# Patient Record
Sex: FEMALE | Race: WHITE | Employment: UNEMPLOYED | URBAN - METROPOLITAN AREA
[De-identification: names, ages, dates, MRNs, and addresses within clinical notes are randomized per-mention and may not be internally consistent; named-entity substitution may affect disease eponyms.]

---

## 2017-06-07 ENCOUNTER — HOSPITAL ENCOUNTER (OUTPATIENT)
Age: 72
Setting detail: SPECIMEN
Discharge: HOME | End: 2017-06-07
Payer: MEDICARE

## 2017-06-07 LAB
ANION GAP SERPL CALCULATED.3IONS-SCNC: 23 MEQ/L (ref 7–13)
BUN BLDV-MCNC: 22 MG/DL (ref 8–23)
CALCIUM SERPL-MCNC: 9.2 MG/DL (ref 8.6–10.2)
CHLORIDE BLD-SCNC: 92 MEQ/L (ref 98–107)
CO2: 21 MEQ/L (ref 22–29)
CREAT SERPL-MCNC: 0.72 MG/DL (ref 0.5–0.9)
GFR AFRICAN AMERICAN: >60
GFR NON-AFRICAN AMERICAN: >60
GLUCOSE BLD-MCNC: 42 MG/DL (ref 74–109)
POTASSIUM SERPL-SCNC: 4.1 MEQ/L (ref 3.5–5.1)
SODIUM BLD-SCNC: 136 MEQ/L (ref 132–144)

## 2017-06-07 PROCEDURE — 80048 BASIC METABOLIC PNL TOTAL CA: CPT

## 2018-06-04 ENCOUNTER — HOSPITAL ENCOUNTER (OUTPATIENT)
Dept: WOMENS IMAGING | Age: 73
Discharge: HOME | End: 2018-06-06
Payer: MEDICARE

## 2018-06-04 DIAGNOSIS — Z12.39 BREAST CANCER SCREENING: ICD-10-CM

## 2018-06-04 PROCEDURE — 77067 SCR MAMMO BI INCL CAD: CPT

## 2019-05-10 ENCOUNTER — HOSPITAL ENCOUNTER (OUTPATIENT)
Age: 74
Discharge: HOME | End: 2019-05-10
Payer: MEDICARE

## 2019-09-11 ENCOUNTER — HOSPITAL ENCOUNTER (OUTPATIENT)
Dept: WOMENS IMAGING | Age: 74
Discharge: HOME | End: 2019-09-13
Payer: MEDICARE

## 2019-09-11 DIAGNOSIS — Z12.39 BREAST CANCER SCREENING: ICD-10-CM

## 2019-09-11 PROCEDURE — 77067 SCR MAMMO BI INCL CAD: CPT

## 2020-12-31 ENCOUNTER — HOSPITAL ENCOUNTER (OUTPATIENT)
Dept: LAB | Age: 75
Discharge: HOME | End: 2020-12-31
Payer: MEDICARE

## 2020-12-31 LAB
ALBUMIN SERPL-MCNC: 4.4 G/DL (ref 3.5–4.6)
ALP BLD-CCNC: 70 U/L (ref 40–130)
ALT SERPL-CCNC: 38 U/L (ref 0–33)
ANION GAP SERPL CALCULATED.3IONS-SCNC: 13 MEQ/L (ref 9–15)
AST SERPL-CCNC: 24 U/L (ref 0–35)
BILIRUB SERPL-MCNC: 0.5 MG/DL (ref 0.2–0.7)
BUN BLDV-MCNC: 20 MG/DL (ref 8–23)
CALCIUM SERPL-MCNC: 9.6 MG/DL (ref 8.5–9.9)
CHLORIDE BLD-SCNC: 98 MEQ/L (ref 95–107)
CHOLESTEROL, TOTAL: 243 MG/DL (ref 0–199)
CO2: 27 MEQ/L (ref 20–31)
CREAT SERPL-MCNC: 0.83 MG/DL (ref 0.5–0.9)
GFR AFRICAN AMERICAN: >60
GFR NON-AFRICAN AMERICAN: >60
GLOBULIN: 3.2 G/DL (ref 2.3–3.5)
GLUCOSE BLD-MCNC: 105 MG/DL (ref 70–99)
HCT VFR BLD CALC: 43.5 % (ref 37–47)
HDLC SERPL-MCNC: 63 MG/DL (ref 40–59)
HEMOGLOBIN: 14.4 G/DL (ref 12–16)
LDL CHOLESTEROL CALCULATED: 150 MG/DL (ref 0–129)
MCH RBC QN AUTO: 32.5 PG (ref 27–31.3)
MCHC RBC AUTO-ENTMCNC: 33.1 % (ref 33–37)
MCV RBC AUTO: 98.3 FL (ref 82–100)
PDW BLD-RTO: 12.4 % (ref 11.5–14.5)
PLATELET # BLD: 279 K/UL (ref 130–400)
POTASSIUM SERPL-SCNC: 4 MEQ/L (ref 3.4–4.9)
RBC # BLD: 4.43 M/UL (ref 4.2–5.4)
SODIUM BLD-SCNC: 138 MEQ/L (ref 135–144)
TOTAL PROTEIN: 7.6 G/DL (ref 6.3–8)
TRIGL SERPL-MCNC: 151 MG/DL (ref 0–150)
WBC # BLD: 7.5 K/UL (ref 4.8–10.8)

## 2020-12-31 PROCEDURE — 80061 LIPID PANEL: CPT

## 2020-12-31 PROCEDURE — 36415 COLL VENOUS BLD VENIPUNCTURE: CPT

## 2020-12-31 PROCEDURE — 85027 COMPLETE CBC AUTOMATED: CPT

## 2020-12-31 PROCEDURE — 80053 COMPREHEN METABOLIC PANEL: CPT

## 2021-12-03 ENCOUNTER — HOSPITAL ENCOUNTER (OUTPATIENT)
Dept: LAB | Age: 76
Discharge: HOME | End: 2021-12-03
Payer: MEDICARE

## 2021-12-03 LAB
ALBUMIN SERPL-MCNC: 4.4 G/DL (ref 3.5–4.6)
ALP BLD-CCNC: 78 U/L (ref 40–130)
ALT SERPL-CCNC: 28 U/L (ref 0–33)
ANION GAP SERPL CALCULATED.3IONS-SCNC: 15 MEQ/L (ref 9–15)
AST SERPL-CCNC: 24 U/L (ref 0–35)
BILIRUB SERPL-MCNC: 0.6 MG/DL (ref 0.2–0.7)
BUN BLDV-MCNC: 23 MG/DL (ref 8–23)
CALCIUM SERPL-MCNC: 9.5 MG/DL (ref 8.5–9.9)
CHLORIDE BLD-SCNC: 96 MEQ/L (ref 95–107)
CHOLESTEROL, TOTAL: 249 MG/DL (ref 0–199)
CO2: 22 MEQ/L (ref 20–31)
CREAT SERPL-MCNC: 0.79 MG/DL (ref 0.5–0.9)
GFR AFRICAN AMERICAN: >60
GFR NON-AFRICAN AMERICAN: >60
GLOBULIN: 3.4 G/DL (ref 2.3–3.5)
GLUCOSE BLD-MCNC: 98 MG/DL (ref 70–99)
HCT VFR BLD CALC: 43.8 % (ref 37–47)
HDLC SERPL-MCNC: 62 MG/DL (ref 40–59)
HEMOGLOBIN: 14.4 G/DL (ref 12–16)
LDL CHOLESTEROL CALCULATED: 164 MG/DL (ref 0–129)
MCH RBC QN AUTO: 32 PG (ref 27–31.3)
MCHC RBC AUTO-ENTMCNC: 32.9 % (ref 33–37)
MCV RBC AUTO: 97.2 FL (ref 82–100)
PDW BLD-RTO: 13 % (ref 11.5–14.5)
PLATELET # BLD: 318 K/UL (ref 130–400)
POTASSIUM SERPL-SCNC: 4.3 MEQ/L (ref 3.4–4.9)
RBC # BLD: 4.51 M/UL (ref 4.2–5.4)
SODIUM BLD-SCNC: 133 MEQ/L (ref 135–144)
TOTAL PROTEIN: 7.8 G/DL (ref 6.3–8)
TRIGL SERPL-MCNC: 117 MG/DL (ref 0–150)
WBC # BLD: 7.3 K/UL (ref 4.8–10.8)

## 2021-12-03 PROCEDURE — 85027 COMPLETE CBC AUTOMATED: CPT

## 2021-12-03 PROCEDURE — 36415 COLL VENOUS BLD VENIPUNCTURE: CPT

## 2021-12-03 PROCEDURE — 80061 LIPID PANEL: CPT

## 2021-12-03 PROCEDURE — 80053 COMPREHEN METABOLIC PANEL: CPT

## 2023-07-21 ENCOUNTER — HOSPITAL ENCOUNTER (OUTPATIENT)
Dept: LAB | Age: 78
Discharge: HOME | End: 2023-07-21
Payer: MEDICARE

## 2023-07-21 LAB
ALBUMIN SERPL-MCNC: 4.4 G/DL (ref 3.5–4.6)
ALP SERPL-CCNC: 65 U/L (ref 40–130)
ALT SERPL-CCNC: 13 U/L (ref 0–33)
ANION GAP SERPL CALCULATED.3IONS-SCNC: 10 MEQ/L (ref 9–15)
AST SERPL-CCNC: 14 U/L (ref 0–35)
BILIRUB SERPL-MCNC: 0.4 MG/DL (ref 0.2–0.7)
BUN SERPL-MCNC: 17 MG/DL (ref 8–23)
CALCIUM SERPL-MCNC: 9.3 MG/DL (ref 8.5–9.9)
CHLORIDE SERPL-SCNC: 100 MEQ/L (ref 95–107)
CHOLEST SERPL-MCNC: 218 MG/DL (ref 0–199)
CO2 SERPL-SCNC: 26 MEQ/L (ref 20–31)
CREAT SERPL-MCNC: 0.64 MG/DL (ref 0.5–0.9)
ERYTHROCYTE [DISTWIDTH] IN BLOOD BY AUTOMATED COUNT: 12.3 % (ref 11.5–14.5)
GLOBULIN SER CALC-MCNC: 2.8 G/DL (ref 2.3–3.5)
GLUCOSE SERPL-MCNC: 116 MG/DL (ref 70–99)
HCT VFR BLD AUTO: 41.1 % (ref 37–47)
HDLC SERPL-MCNC: 68 MG/DL (ref 40–59)
HGB BLD-MCNC: 14 G/DL (ref 12–16)
LDLC SERPL CALC-MCNC: 130 MG/DL (ref 0–129)
MCH RBC QN AUTO: 32.9 PG (ref 27–31.3)
MCHC RBC AUTO-ENTMCNC: 34 % (ref 33–37)
MCV RBC AUTO: 96.9 FL (ref 79.4–94.8)
PLATELET # BLD AUTO: 273 K/UL (ref 130–400)
POTASSIUM SERPL-SCNC: 4.1 MEQ/L (ref 3.4–4.9)
PROT SERPL-MCNC: 7.2 G/DL (ref 6.3–8)
RBC # BLD AUTO: 4.24 M/UL (ref 4.2–5.4)
SODIUM SERPL-SCNC: 136 MEQ/L (ref 135–144)
TRIGL SERPL-MCNC: 98 MG/DL (ref 0–150)
WBC # BLD AUTO: 7.1 K/UL (ref 4.8–10.8)

## 2023-07-21 PROCEDURE — 80061 LIPID PANEL: CPT

## 2023-07-21 PROCEDURE — 80053 COMPREHEN METABOLIC PANEL: CPT

## 2023-07-21 PROCEDURE — 36415 COLL VENOUS BLD VENIPUNCTURE: CPT

## 2023-07-21 PROCEDURE — 85027 COMPLETE CBC AUTOMATED: CPT

## 2024-03-07 ENCOUNTER — ANESTHESIA EVENT (OUTPATIENT)
Dept: OPERATING ROOM | Facility: HOSPITAL | Age: 79
DRG: 481 | End: 2024-03-07
Payer: MEDICARE

## 2024-03-07 ENCOUNTER — APPOINTMENT (OUTPATIENT)
Dept: RADIOLOGY | Facility: HOSPITAL | Age: 79
DRG: 481 | End: 2024-03-07
Payer: MEDICARE

## 2024-03-07 ENCOUNTER — HOSPITAL ENCOUNTER (INPATIENT)
Facility: HOSPITAL | Age: 79
LOS: 4 days | Discharge: NURSING, REHAB OR LONG-TERM CARE | DRG: 481 | End: 2024-03-11
Attending: EMERGENCY MEDICINE | Admitting: HOSPITALIST
Payer: MEDICARE

## 2024-03-07 ENCOUNTER — APPOINTMENT (OUTPATIENT)
Dept: CARDIOLOGY | Facility: HOSPITAL | Age: 79
DRG: 481 | End: 2024-03-07
Payer: MEDICARE

## 2024-03-07 DIAGNOSIS — S72.344A: Primary | ICD-10-CM

## 2024-03-07 LAB
ABO GROUP (TYPE) IN BLOOD: NORMAL
ANION GAP SERPL CALC-SCNC: 15 MMOL/L (ref 10–20)
ANTIBODY SCREEN: NORMAL
BASOPHILS # BLD AUTO: 0.05 X10*3/UL (ref 0–0.1)
BASOPHILS NFR BLD AUTO: 0.4 %
BUN SERPL-MCNC: 18 MG/DL (ref 6–23)
CALCIUM SERPL-MCNC: 9.1 MG/DL (ref 8.6–10.3)
CHLORIDE SERPL-SCNC: 97 MMOL/L (ref 98–107)
CO2 SERPL-SCNC: 25 MMOL/L (ref 21–32)
CREAT SERPL-MCNC: 0.7 MG/DL (ref 0.5–1.05)
EGFRCR SERPLBLD CKD-EPI 2021: 88 ML/MIN/1.73M*2
EOSINOPHIL # BLD AUTO: 0.04 X10*3/UL (ref 0–0.4)
EOSINOPHIL NFR BLD AUTO: 0.3 %
ERYTHROCYTE [DISTWIDTH] IN BLOOD BY AUTOMATED COUNT: 11.6 % (ref 11.5–14.5)
GLUCOSE SERPL-MCNC: 123 MG/DL (ref 74–99)
HCT VFR BLD AUTO: 38.2 % (ref 36–46)
HGB BLD-MCNC: 13.1 G/DL (ref 12–16)
IMM GRANULOCYTES # BLD AUTO: 0.07 X10*3/UL (ref 0–0.5)
IMM GRANULOCYTES NFR BLD AUTO: 0.5 % (ref 0–0.9)
INR PPP: 1 (ref 0.9–1.1)
LYMPHOCYTES # BLD AUTO: 1.11 X10*3/UL (ref 0.8–3)
LYMPHOCYTES NFR BLD AUTO: 7.9 %
MCH RBC QN AUTO: 32.5 PG (ref 26–34)
MCHC RBC AUTO-ENTMCNC: 34.3 G/DL (ref 32–36)
MCV RBC AUTO: 95 FL (ref 80–100)
MONOCYTES # BLD AUTO: 0.92 X10*3/UL (ref 0.05–0.8)
MONOCYTES NFR BLD AUTO: 6.5 %
NEUTROPHILS # BLD AUTO: 11.89 X10*3/UL (ref 1.6–5.5)
NEUTROPHILS NFR BLD AUTO: 84.4 %
NRBC BLD-RTO: 0 /100 WBCS (ref 0–0)
PLATELET # BLD AUTO: 266 X10*3/UL (ref 150–450)
POTASSIUM SERPL-SCNC: 3.7 MMOL/L (ref 3.5–5.3)
PROTHROMBIN TIME: 11.5 SECONDS (ref 9.8–12.8)
RBC # BLD AUTO: 4.03 X10*6/UL (ref 4–5.2)
RH FACTOR (ANTIGEN D): NORMAL
SODIUM SERPL-SCNC: 133 MMOL/L (ref 136–145)
WBC # BLD AUTO: 14.1 X10*3/UL (ref 4.4–11.3)

## 2024-03-07 PROCEDURE — 80048 BASIC METABOLIC PNL TOTAL CA: CPT | Performed by: PHYSICIAN ASSISTANT

## 2024-03-07 PROCEDURE — 73700 CT LOWER EXTREMITY W/O DYE: CPT | Mod: RT

## 2024-03-07 PROCEDURE — 99285 EMERGENCY DEPT VISIT HI MDM: CPT | Mod: 25

## 2024-03-07 PROCEDURE — 73560 X-RAY EXAM OF KNEE 1 OR 2: CPT | Mod: RT

## 2024-03-07 PROCEDURE — 2500000001 HC RX 250 WO HCPCS SELF ADMINISTERED DRUGS (ALT 637 FOR MEDICARE OP): Performed by: HOSPITALIST

## 2024-03-07 PROCEDURE — 2500000004 HC RX 250 GENERAL PHARMACY W/ HCPCS (ALT 636 FOR OP/ED): Performed by: PHYSICIAN ASSISTANT

## 2024-03-07 PROCEDURE — 73590 X-RAY EXAM OF LOWER LEG: CPT | Mod: RT

## 2024-03-07 PROCEDURE — 1100000001 HC PRIVATE ROOM DAILY

## 2024-03-07 PROCEDURE — 85025 COMPLETE CBC W/AUTO DIFF WBC: CPT | Performed by: PHYSICIAN ASSISTANT

## 2024-03-07 PROCEDURE — 73700 CT LOWER EXTREMITY W/O DYE: CPT | Mod: FOREIGN READ | Performed by: RADIOLOGY

## 2024-03-07 PROCEDURE — 36415 COLL VENOUS BLD VENIPUNCTURE: CPT | Performed by: PHYSICIAN ASSISTANT

## 2024-03-07 PROCEDURE — 73590 X-RAY EXAM OF LOWER LEG: CPT | Mod: RIGHT SIDE | Performed by: RADIOLOGY

## 2024-03-07 PROCEDURE — 93005 ELECTROCARDIOGRAM TRACING: CPT

## 2024-03-07 PROCEDURE — 73552 X-RAY EXAM OF FEMUR 2/>: CPT | Mod: RIGHT SIDE | Performed by: RADIOLOGY

## 2024-03-07 PROCEDURE — 73560 X-RAY EXAM OF KNEE 1 OR 2: CPT | Mod: RIGHT SIDE | Performed by: RADIOLOGY

## 2024-03-07 PROCEDURE — 73552 X-RAY EXAM OF FEMUR 2/>: CPT | Mod: RT

## 2024-03-07 PROCEDURE — 76377 3D RENDER W/INTRP POSTPROCES: CPT

## 2024-03-07 PROCEDURE — 85610 PROTHROMBIN TIME: CPT | Performed by: PHYSICIAN ASSISTANT

## 2024-03-07 PROCEDURE — 76377 3D RENDER W/INTRP POSTPROCES: CPT | Mod: FOREIGN READ | Performed by: RADIOLOGY

## 2024-03-07 PROCEDURE — 86900 BLOOD TYPING SEROLOGIC ABO: CPT | Performed by: PHYSICIAN ASSISTANT

## 2024-03-07 PROCEDURE — 99223 1ST HOSP IP/OBS HIGH 75: CPT | Performed by: HOSPITALIST

## 2024-03-07 PROCEDURE — 2500000004 HC RX 250 GENERAL PHARMACY W/ HCPCS (ALT 636 FOR OP/ED): Performed by: HOSPITALIST

## 2024-03-07 PROCEDURE — 96374 THER/PROPH/DIAG INJ IV PUSH: CPT

## 2024-03-07 RX ORDER — ENOXAPARIN SODIUM 100 MG/ML
40 INJECTION SUBCUTANEOUS EVERY 24 HOURS
Status: DISCONTINUED | OUTPATIENT
Start: 2024-03-07 | End: 2024-03-11 | Stop reason: HOSPADM

## 2024-03-07 RX ORDER — ACETAMINOPHEN 325 MG/1
650 TABLET ORAL EVERY 4 HOURS PRN
Status: DISCONTINUED | OUTPATIENT
Start: 2024-03-07 | End: 2024-03-11 | Stop reason: HOSPADM

## 2024-03-07 RX ORDER — ACETAMINOPHEN 650 MG/1
650 SUPPOSITORY RECTAL EVERY 4 HOURS PRN
Status: DISCONTINUED | OUTPATIENT
Start: 2024-03-07 | End: 2024-03-11 | Stop reason: HOSPADM

## 2024-03-07 RX ORDER — ALPRAZOLAM 0.25 MG/1
0.25 TABLET ORAL NIGHTLY PRN
Status: DISCONTINUED | OUTPATIENT
Start: 2024-03-07 | End: 2024-03-11 | Stop reason: HOSPADM

## 2024-03-07 RX ORDER — ACETAMINOPHEN 160 MG/5ML
650 SOLUTION ORAL EVERY 4 HOURS PRN
Status: DISCONTINUED | OUTPATIENT
Start: 2024-03-07 | End: 2024-03-11 | Stop reason: HOSPADM

## 2024-03-07 RX ORDER — OXYCODONE HYDROCHLORIDE 5 MG/1
5 TABLET ORAL EVERY 4 HOURS PRN
Status: DISCONTINUED | OUTPATIENT
Start: 2024-03-07 | End: 2024-03-11 | Stop reason: HOSPADM

## 2024-03-07 RX ORDER — AMLODIPINE BESYLATE 5 MG/1
5 TABLET ORAL NIGHTLY
Status: DISCONTINUED | OUTPATIENT
Start: 2024-03-07 | End: 2024-03-11 | Stop reason: HOSPADM

## 2024-03-07 RX ORDER — SODIUM CHLORIDE 9 MG/ML
75 INJECTION, SOLUTION INTRAVENOUS CONTINUOUS
Status: DISCONTINUED | OUTPATIENT
Start: 2024-03-08 | End: 2024-03-09

## 2024-03-07 RX ORDER — POLYETHYLENE GLYCOL 3350 17 G/17G
17 POWDER, FOR SOLUTION ORAL DAILY PRN
Status: DISCONTINUED | OUTPATIENT
Start: 2024-03-07 | End: 2024-03-11 | Stop reason: HOSPADM

## 2024-03-07 RX ORDER — HYDROXYZINE HYDROCHLORIDE 25 MG/1
25 TABLET, FILM COATED ORAL EVERY 6 HOURS PRN
Status: DISCONTINUED | OUTPATIENT
Start: 2024-03-07 | End: 2024-03-11 | Stop reason: HOSPADM

## 2024-03-07 RX ORDER — LORAZEPAM 2 MG/ML
0.5 INJECTION INTRAMUSCULAR ONCE
Status: COMPLETED | OUTPATIENT
Start: 2024-03-07 | End: 2024-03-07

## 2024-03-07 RX ORDER — ONDANSETRON HYDROCHLORIDE 2 MG/ML
4 INJECTION, SOLUTION INTRAVENOUS EVERY 8 HOURS PRN
Status: DISCONTINUED | OUTPATIENT
Start: 2024-03-07 | End: 2024-03-11 | Stop reason: HOSPADM

## 2024-03-07 RX ORDER — MORPHINE SULFATE 4 MG/ML
4 INJECTION, SOLUTION INTRAMUSCULAR; INTRAVENOUS EVERY 4 HOURS PRN
Status: DISCONTINUED | OUTPATIENT
Start: 2024-03-07 | End: 2024-03-11 | Stop reason: HOSPADM

## 2024-03-07 RX ORDER — AMLODIPINE BESYLATE 5 MG/1
1 TABLET ORAL NIGHTLY
COMMUNITY

## 2024-03-07 RX ORDER — ALPRAZOLAM 0.25 MG/1
0.25 TABLET ORAL NIGHTLY PRN
COMMUNITY

## 2024-03-07 RX ORDER — ONDANSETRON 4 MG/1
4 TABLET, FILM COATED ORAL EVERY 8 HOURS PRN
Status: DISCONTINUED | OUTPATIENT
Start: 2024-03-07 | End: 2024-03-11 | Stop reason: HOSPADM

## 2024-03-07 RX ORDER — LOSARTAN POTASSIUM AND HYDROCHLOROTHIAZIDE 12.5; 1 MG/1; MG/1
1 TABLET ORAL NIGHTLY
COMMUNITY

## 2024-03-07 RX ORDER — CYCLOBENZAPRINE HCL 10 MG
5 TABLET ORAL 3 TIMES DAILY PRN
Status: DISCONTINUED | OUTPATIENT
Start: 2024-03-07 | End: 2024-03-11 | Stop reason: HOSPADM

## 2024-03-07 RX ADMIN — MORPHINE SULFATE 4 MG: 4 INJECTION, SOLUTION INTRAMUSCULAR; INTRAVENOUS at 22:28

## 2024-03-07 RX ADMIN — AMLODIPINE BESYLATE 5 MG: 5 TABLET ORAL at 20:24

## 2024-03-07 RX ADMIN — ACETAMINOPHEN 650 MG: 325 TABLET ORAL at 20:23

## 2024-03-07 RX ADMIN — LORAZEPAM 0.5 MG: 2 INJECTION INTRAMUSCULAR; INTRAVENOUS at 13:05

## 2024-03-07 RX ADMIN — ENOXAPARIN SODIUM 40 MG: 40 INJECTION SUBCUTANEOUS at 20:24

## 2024-03-07 RX ADMIN — OXYCODONE HYDROCHLORIDE 5 MG: 5 TABLET ORAL at 16:47

## 2024-03-07 RX ADMIN — OXYCODONE HYDROCHLORIDE 5 MG: 5 TABLET ORAL at 21:53

## 2024-03-07 RX ADMIN — ALPRAZOLAM 0.25 MG: 0.25 TABLET ORAL at 20:23

## 2024-03-07 RX ADMIN — LOSARTAN POTASSIUM: 100 TABLET, FILM COATED ORAL at 20:23

## 2024-03-07 RX ADMIN — CYCLOBENZAPRINE HYDROCHLORIDE 5 MG: 10 TABLET, FILM COATED ORAL at 16:49

## 2024-03-07 SDOH — SOCIAL STABILITY: SOCIAL INSECURITY: WERE YOU ABLE TO COMPLETE ALL THE BEHAVIORAL HEALTH SCREENINGS?: YES

## 2024-03-07 SDOH — SOCIAL STABILITY: SOCIAL INSECURITY: ABUSE: ADULT

## 2024-03-07 SDOH — SOCIAL STABILITY: SOCIAL INSECURITY: ARE THERE ANY APPARENT SIGNS OF INJURIES/BEHAVIORS THAT COULD BE RELATED TO ABUSE/NEGLECT?: NO

## 2024-03-07 SDOH — SOCIAL STABILITY: SOCIAL INSECURITY: HAS ANYONE EVER THREATENED TO HURT YOUR FAMILY OR YOUR PETS?: NO

## 2024-03-07 SDOH — SOCIAL STABILITY: SOCIAL INSECURITY: DOES ANYONE TRY TO KEEP YOU FROM HAVING/CONTACTING OTHER FRIENDS OR DOING THINGS OUTSIDE YOUR HOME?: NO

## 2024-03-07 SDOH — SOCIAL STABILITY: SOCIAL INSECURITY: DO YOU FEEL ANYONE HAS EXPLOITED OR TAKEN ADVANTAGE OF YOU FINANCIALLY OR OF YOUR PERSONAL PROPERTY?: NO

## 2024-03-07 SDOH — SOCIAL STABILITY: SOCIAL INSECURITY: ARE YOU OR HAVE YOU BEEN THREATENED OR ABUSED PHYSICALLY, EMOTIONALLY, OR SEXUALLY BY ANYONE?: NO

## 2024-03-07 SDOH — SOCIAL STABILITY: SOCIAL INSECURITY: HAVE YOU HAD THOUGHTS OF HARMING ANYONE ELSE?: NO

## 2024-03-07 ASSESSMENT — PAIN - FUNCTIONAL ASSESSMENT
PAIN_FUNCTIONAL_ASSESSMENT: 0-10

## 2024-03-07 ASSESSMENT — PAIN SCALES - GENERAL
PAINLEVEL_OUTOF10: 8
PAINLEVEL_OUTOF10: 5 - MODERATE PAIN
PAINLEVEL_OUTOF10: 5 - MODERATE PAIN
PAINLEVEL_OUTOF10: 3
PAINLEVEL_OUTOF10: 9
PAINLEVEL_OUTOF10: 8
PAINLEVEL_OUTOF10: 6
PAINLEVEL_OUTOF10: 8

## 2024-03-07 ASSESSMENT — COGNITIVE AND FUNCTIONAL STATUS - GENERAL
MOBILITY SCORE: 15
EATING MEALS: A LITTLE
CLIMB 3 TO 5 STEPS WITH RAILING: TOTAL
WALKING IN HOSPITAL ROOM: TOTAL
TURNING FROM BACK TO SIDE WHILE IN FLAT BAD: A LITTLE
HELP NEEDED FOR BATHING: A LOT
HELP NEEDED FOR BATHING: A LOT
MOVING TO AND FROM BED TO CHAIR: A LITTLE
DRESSING REGULAR LOWER BODY CLOTHING: A LOT
PERSONAL GROOMING: A LITTLE
PATIENT BASELINE BEDBOUND: NO
DAILY ACTIVITIY SCORE: 18
CLIMB 3 TO 5 STEPS WITH RAILING: TOTAL
MOVING TO AND FROM BED TO CHAIR: A LOT
MOBILITY SCORE: 10
MOVING TO AND FROM BED TO CHAIR: A LITTLE
TURNING FROM BACK TO SIDE WHILE IN FLAT BAD: A LITTLE
STANDING UP FROM CHAIR USING ARMS: A LITTLE
WALKING IN HOSPITAL ROOM: TOTAL
MOBILITY SCORE: 15
DRESSING REGULAR LOWER BODY CLOTHING: A LOT
TOILETING: A LOT
STANDING UP FROM CHAIR USING ARMS: A LOT
HELP NEEDED FOR BATHING: A LOT
DAILY ACTIVITIY SCORE: 16
TURNING FROM BACK TO SIDE WHILE IN FLAT BAD: A LOT
TOILETING: A LITTLE
DAILY ACTIVITIY SCORE: 13
DRESSING REGULAR UPPER BODY CLOTHING: A LITTLE
DRESSING REGULAR UPPER BODY CLOTHING: A LOT
MOVING FROM LYING ON BACK TO SITTING ON SIDE OF FLAT BED WITH BEDRAILS: A LOT
TOILETING: A LOT
CLIMB 3 TO 5 STEPS WITH RAILING: TOTAL
DRESSING REGULAR UPPER BODY CLOTHING: A LITTLE
PERSONAL GROOMING: A LOT
WALKING IN HOSPITAL ROOM: TOTAL
DRESSING REGULAR LOWER BODY CLOTHING: A LOT
STANDING UP FROM CHAIR USING ARMS: A LITTLE

## 2024-03-07 ASSESSMENT — LIFESTYLE VARIABLES
EVER FELT BAD OR GUILTY ABOUT YOUR DRINKING: NO
HAVE PEOPLE ANNOYED YOU BY CRITICIZING YOUR DRINKING: NO
SKIP TO QUESTIONS 9-10: 1
HOW OFTEN DO YOU HAVE 6 OR MORE DRINKS ON ONE OCCASION: NEVER
AUDIT-C TOTAL SCORE: 0
SUBSTANCE_ABUSE_PAST_12_MONTHS: NO
PRESCIPTION_ABUSE_PAST_12_MONTHS: NO
HOW MANY STANDARD DRINKS CONTAINING ALCOHOL DO YOU HAVE ON A TYPICAL DAY: PATIENT DOES NOT DRINK
EVER HAD A DRINK FIRST THING IN THE MORNING TO STEADY YOUR NERVES TO GET RID OF A HANGOVER: NO
HAVE YOU EVER FELT YOU SHOULD CUT DOWN ON YOUR DRINKING: NO
AUDIT-C TOTAL SCORE: 0
HOW OFTEN DO YOU HAVE A DRINK CONTAINING ALCOHOL: NEVER

## 2024-03-07 ASSESSMENT — ENCOUNTER SYMPTOMS
ENDOCRINE NEGATIVE: 1
CONSTITUTIONAL NEGATIVE: 1
ARTHRALGIAS: 1
FEVER: 0
EYES NEGATIVE: 1
ALLERGIC/IMMUNOLOGIC NEGATIVE: 1
PSYCHIATRIC NEGATIVE: 1
SHORTNESS OF BREATH: 0
HEMATOLOGIC/LYMPHATIC NEGATIVE: 1
GASTROINTESTINAL NEGATIVE: 1
ACTIVITY CHANGE: 0

## 2024-03-07 ASSESSMENT — ACTIVITIES OF DAILY LIVING (ADL)
LACK_OF_TRANSPORTATION: NO
PATIENT'S MEMORY ADEQUATE TO SAFELY COMPLETE DAILY ACTIVITIES?: YES
ADEQUATE_TO_COMPLETE_ADL: YES
WALKS IN HOME: INDEPENDENT
TOILETING: INDEPENDENT
GROOMING: INDEPENDENT
FEEDING YOURSELF: INDEPENDENT
HEARING - LEFT EAR: FUNCTIONAL
HEARING - RIGHT EAR: FUNCTIONAL
BATHING: INDEPENDENT
JUDGMENT_ADEQUATE_SAFELY_COMPLETE_DAILY_ACTIVITIES: YES
DRESSING YOURSELF: INDEPENDENT

## 2024-03-07 ASSESSMENT — PAIN DESCRIPTION - ORIENTATION
ORIENTATION: RIGHT
ORIENTATION: LEFT

## 2024-03-07 ASSESSMENT — PAIN DESCRIPTION - ONSET: ONSET: SUDDEN

## 2024-03-07 ASSESSMENT — PATIENT HEALTH QUESTIONNAIRE - PHQ9
SUM OF ALL RESPONSES TO PHQ9 QUESTIONS 1 & 2: 0
2. FEELING DOWN, DEPRESSED OR HOPELESS: NOT AT ALL
1. LITTLE INTEREST OR PLEASURE IN DOING THINGS: NOT AT ALL

## 2024-03-07 ASSESSMENT — PAIN DESCRIPTION - DESCRIPTORS
DESCRIPTORS: ACHING;SHARP
DESCRIPTORS: PRESSURE
DESCRIPTORS: SHARP
DESCRIPTORS: SHARP

## 2024-03-07 ASSESSMENT — PAIN DESCRIPTION - PAIN TYPE: TYPE: ACUTE PAIN

## 2024-03-07 ASSESSMENT — COLUMBIA-SUICIDE SEVERITY RATING SCALE - C-SSRS
2. HAVE YOU ACTUALLY HAD ANY THOUGHTS OF KILLING YOURSELF?: NO
1. IN THE PAST MONTH, HAVE YOU WISHED YOU WERE DEAD OR WISHED YOU COULD GO TO SLEEP AND NOT WAKE UP?: NO
6. HAVE YOU EVER DONE ANYTHING, STARTED TO DO ANYTHING, OR PREPARED TO DO ANYTHING TO END YOUR LIFE?: NO

## 2024-03-07 ASSESSMENT — PAIN DESCRIPTION - PROGRESSION: CLINICAL_PROGRESSION: NOT CHANGED

## 2024-03-07 ASSESSMENT — PAIN DESCRIPTION - FREQUENCY: FREQUENCY: CONSTANT/CONTINUOUS

## 2024-03-07 ASSESSMENT — PAIN DESCRIPTION - LOCATION
LOCATION: LEG
LOCATION: KNEE

## 2024-03-07 NOTE — ED PROVIDER NOTES
HPI   Chief Complaint   Patient presents with    Knee Injury     Right knee injury       A 78-year-old female patient with history of bilateral knee replacements comes in the emergency department today by EMS secondary to a fall at home.  States she was getting up from a chair turning around when her heel was caught on her pant leg causing her right knee to twist causing her to fall down to the ground secondary to the pain.  Denies hitting her head or loss consciousness.  States all her pain is significantly located just superior to the right knee.  She rates her pain a 6 out of 10 on the pain scale at this time.  States any movement causes this pain to significantly increased in severity.                          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  Social History     Tobacco Use    Smoking status: Never    Smokeless tobacco: Never   Vaping Use    Vaping Use: Never used   Substance Use Topics    Alcohol use: Defer    Drug use: Never       Physical Exam   ED Triage Vitals [03/07/24 1057]   Temperature Heart Rate Respirations BP   36.9 °C (98.4 °F) 88 20 165/59      Pulse Ox Temp Source Heart Rate Source Patient Position   96 % Temporal Monitor Sitting      BP Location FiO2 (%)     Right arm --       Physical Exam  Constitutional:       General: She is in acute distress (Moderate distress).      Appearance: Normal appearance. She is not ill-appearing or diaphoretic.   HENT:       Head: Normocephalic and atraumatic.      Nose: Nose normal.   Eyes:      Extraocular Movements: Extraocular movements intact.      Conjunctiva/sclera: Conjunctivae normal.   Cardiovascular:      Rate and Rhythm: Normal rate and regular rhythm.   Pulmonary:      Effort: Pulmonary effort is normal. No respiratory distress.      Breath sounds: Normal breath sounds. No stridor. No wheezing.   Musculoskeletal:         General: Tenderness (Tenderness palpation just.  To the right patella as well as medial lateral aspects of the right knee, superior fibula) present. Normal range of motion.      Cervical back: Normal range of motion.   Skin:     General: Skin is warm and dry.   Neurological:      General: No focal deficit present.      Mental Status: She is alert and oriented to person, place, and time. Mental status is at baseline.   Psychiatric:         Mood and Affect: Mood normal.         ED Course & MDM   Diagnoses as of 03/07/24 1429   Nondisplaced spiral fracture of shaft of right femur, initial encounter for closed fracture (CMS/[facility])       Medical Decision Making  A 78-year-old female patient with history of bilateral knee replacements comes in the emergency department today by EMS secondary to a fall at home.  States she was getting up from a chair turning around when her heel was caught on her pant leg causing her right knee to twist causing her to fall down to the ground secondary to the pain.  Denies hitting her head or loss consciousness.  States all her pain is significantly located just superior to the right knee.  She rates her pain a 6 out of 10 on the pain scale at this time.  States any movement causes this pain to significantly increased in severity.    X-ray of the right femur, right knee, right tib-fib ordered to rule out any acute bony abnormality, dislocations of the right lower extremity.  Offered medication for pain at this time patient declines    Patient's metabolic panel shows glucose 123  sodium 133.  Patient's INR 1 white blood cell count of 14.1 absolute neutrophil count 11.89.  Type and screen also sent.  Patient's femur films show an oblique mildly displaced fracture through the distal femoral diaphysis just proximal to the femoral component of the total knee arthroplasty.  I did discuss this patient with Dr. Jauregui with orthopedic surgery.  He states that to place the patient in a knee immobilizer get a CTA of the femur and admit patient to medicine.  States he will do procedure tomorrow.    CT of the femur ordered.  Call placed to medicine for admission    Historians patient    Diagnosis: Distal femur fracture, right    Patient is feeling very anxious about this news is requesting something for anxiety.  0.5 IV of Ativan ordered.  EKG: My EKG interpretation, 80 bpm, sinus rhythm, right bundle branch block    I discussed case with Dr. Diaz the hospitalist who agrees admit the patient under his service      Labs Reviewed   CBC WITH AUTO DIFFERENTIAL - Abnormal       Result Value    WBC 14.1 (*)     nRBC 0.0      RBC 4.03      Hemoglobin 13.1      Hematocrit 38.2      MCV 95      MCH 32.5      MCHC 34.3      RDW 11.6      Platelets 266      Neutrophils % 84.4      Immature Granulocytes %, Automated 0.5      Lymphocytes % 7.9      Monocytes % 6.5      Eosinophils % 0.3      Basophils % 0.4      Neutrophils Absolute 11.89 (*)     Immature Granulocytes Absolute, Automated 0.07      Lymphocytes Absolute 1.11      Monocytes Absolute 0.92 (*)     Eosinophils Absolute 0.04      Basophils Absolute 0.05     BASIC METABOLIC PANEL - Abnormal    Glucose 123 (*)     Sodium 133 (*)     Potassium 3.7      Chloride 97 (*)     Bicarbonate 25      Anion Gap 15      Urea Nitrogen 18      Creatinine 0.70      eGFR 88      Calcium 9.1     PROTIME-INR - Normal    Protime 11.5      INR 1.0     TYPE AND SCREEN    ABO TYPE O      Rh TYPE POS      ANTIBODY SCREEN NEG          XR femur right 2+ views   Final Result    Oblique mildly displaced fracture through the distal femoral diaphysis   just proximal to the femoral component of the total knee arthroplasty.   Signed by Wojciech Tubbs MD      XR tibia fibula right 2 views   Final Result   Oblique mildly displaced fracture through the distal femoral diaphysis   just proximal to the femoral component of the total knee arthroplasty.   Signed by Wojciech Tubbs MD      XR knee right 1-2 views   Final Result   Oblique mildly displaced fracture through the distal femoral diaphysis   just proximal to the femoral component of the total knee arthroplasty.   Signed by Wojciech Tubbs MD      CT femur right wo IV contrast    (Results Pending)   CT 3D reconstruction    (Results Pending)           Shared CARLOS Attestation:    This patient was seen by the advanced practice provider.  I personally saw the patient and made/approved the management plan and take responsibility for the patient management.    History: 78-year-old female presents with right leg pain after fall.    Exam: Regular rate and rhythm cardiac exam with clear breath sounds bilaterally.  Abdomen soft and nontender.  Neurological exam is grossly intact.  There is tenderness to palpation to the right distal thigh.    MDM: Multisystem trauma    I independently interpreted the following study(s): My interpretation of right femur x-ray shows a displaced distal femur fracture near the prosthesis    I have seen and examined the patient, agree with the workup, evaluation, medical decision making, management and diagnosis.  The care plan has been discussed.    Jose Alberto Gagnon MD      Procedure  Procedures     Harish Elizabeth PA-C  03/07/24 1429

## 2024-03-07 NOTE — CONSULTS
Consults      Consult Note  Patient: Aida Baker  Unit/Bed: AC15/AC15  YOB: 1945  MRN: [id number]  Acct: [id number]   Admitting Diagnosis: No admission diagnoses are documented for this encounter.  Date:  3/7/2024  Hospital Day: 0    Complaint:  Right leg pain    History of Present Illness:  Aida Baker is a 78-year-old female patient per chart review with history of parathyroid carcinoma and hypertension who presented to [facility] emergency room status post mechanical fall at home prior to arrival.  She reports standing up from her dining room table when her pant leg got caught causing her to twist her right lower extremity causing her to fall to the ground.  She denies hitting her head or loss of consciousness.  She was unable to get up or ambulate due to pain therefore was brought in via EMS.  Imaging performed in the emergency room showed patient to have a mildly displaced distal femur fracture to the femoral component of the total knee arthroplasty in which orthopedics was consulted for evaluation and treatment recommendations.        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    Personal history of other diseases of the circulatory system 01/10/2018    History of hypertension       PSHx:  Past Surgical History:   Procedure Laterality Date    COLONOSCOPY  01/10/2018    Complete Colonoscopy    HEMORRHOID SURGERY  01/10/2018    Hemorrhoidectomy    THYROID SURGERY  01/10/2018    Thyroid Surgery    TOTAL KNEE ARTHROPLASTY  01/10/2018    Knee Replacement       Social Hx:  Social History     Socioeconomic History    Marital status:      Spouse name: None    Number of children: None    Years of education: None    Highest education level: None   Occupational History    None   Tobacco Use    Smoking status: Never    Smokeless tobacco: Never   Vaping Use    Vaping Use: Never used   Substance and  Sexual Activity    Alcohol use: Defer    Drug use: Never    Sexual activity: Defer   Other Topics Concern    None   Social History Narrative    None     Social Determinants of Health     Financial Resource Strain: Not on file   Food Insecurity: Not on file   Transportation Needs: Not on file   Physical Activity: Not on file   Stress: Not on file   Social Connections: Not on file   Intimate Partner Violence: Not on file   Housing Stability: Not on file       Family Hx:  No family history on file.    Review of Systems:   Review of Systems   Constitutional: Negative.  Negative for activity change and fever.   HENT: Negative.     Eyes: Negative.    Respiratory:  Negative for shortness of breath.    Cardiovascular:  Positive for leg swelling. Negative for chest pain.   Gastrointestinal: Negative.    Endocrine: Negative.    Genitourinary: Negative.    Musculoskeletal:  Positive for arthralgias and gait problem.   Skin: Negative.    Allergic/Immunologic: Negative.    Hematological: Negative.    Psychiatric/Behavioral: Negative.     All other systems reviewed and are negative.        Physical Examination:    Visit Vitals  /73   Pulse 91   Temp 36.9 °C (98.4 °F) (Temporal)   Resp 20      Physical Exam  Vitals and nursing note reviewed.   Constitutional:       General: She is not in acute distress.     Appearance: Normal appearance.   HENT:      Head: Normocephalic.      Nose: Nose normal.      Mouth/Throat:      Mouth: Mucous membranes are moist.   Eyes:      Extraocular Movements: Extraocular movements intact.      Pupils: Pupils are equal, round, and reactive to light.   Cardiovascular:      Rate and Rhythm: Normal rate and regular rhythm.      Pulses: Normal pulses.      Heart sounds: Normal heart sounds.   Pulmonary:      Effort: Pulmonary effort is normal.      Breath sounds: Normal breath sounds.   Abdominal:      General: Bowel sounds are normal.      Palpations: Abdomen is soft.   Musculoskeletal:          "General: Tenderness, deformity and signs of injury present.      Cervical back: Normal range of motion. No rigidity or tenderness.   Skin:     General: Skin is warm.      Capillary Refill: Capillary refill takes less than 2 seconds.   Neurological:      General: No focal deficit present.      Mental Status: She is alert and oriented to person, place, and time.   Psychiatric:         Mood and Affect: Mood normal.         LABS:  CBC:   Lab Results   Component Value Date    WBC 14.1 (H) 03/07/2024    RBC 4.03 03/07/2024    HGB 13.1 03/07/2024    HCT 38.2 03/07/2024    MCV 95 03/07/2024    MCH 32.5 03/07/2024    MCHC 34.3 03/07/2024    RDW 11.6 03/07/2024     03/07/2024     CBC with Differential:    Lab Results   Component Value Date    WBC 14.1 (H) 03/07/2024    RBC 4.03 03/07/2024    HGB 13.1 03/07/2024    HCT 38.2 03/07/2024     03/07/2024    MCV 95 03/07/2024    MCH 32.5 03/07/2024    MCHC 34.3 03/07/2024    RDW 11.6 03/07/2024    NRBC 0.0 03/07/2024    LYMPHOPCT 7.9 03/07/2024    MONOPCT 6.5 03/07/2024    EOSPCT 0.3 03/07/2024    BASOPCT 0.4 03/07/2024    MONOSABS 0.92 (H) 03/07/2024    LYMPHSABS 1.11 03/07/2024    EOSABS 0.04 03/07/2024    BASOSABS 0.05 03/07/2024     CMP:    Lab Results   Component Value Date     (L) 03/07/2024    K 3.7 03/07/2024    CL 97 (L) 03/07/2024    CO2 25 03/07/2024    BUN 18 03/07/2024    CREATININE 0.70 03/07/2024    GLUCOSE 123 (H) 03/07/2024    CALCIUM 9.1 03/07/2024     BMP:    Lab Results   Component Value Date     (L) 03/07/2024    K 3.7 03/07/2024    CL 97 (L) 03/07/2024    CO2 25 03/07/2024    BUN 18 03/07/2024    CREATININE 0.70 03/07/2024    CALCIUM 9.1 03/07/2024    GLUCOSE 123 (H) 03/07/2024     Magnesium:No results found for: \"MG\"  Troponin:  No results found for: \"TROPONINI\"    Imaging   ECG 12 lead    Result Date: 3/7/2024  Sinus rhythm with Premature atrial complexes Right bundle branch block Abnormal ECG When compared with ECG of 04-MAY-2004 " 11:32, Premature atrial complexes are now Present Right bundle branch block is now Present    XR femur right 2+ views    Result Date: 3/7/2024  STUDY: Knee, Femur and Tibia/Fibular Radiographs; 3/7/24 at 12:09 PM INDICATION: Fall, pain. COMPARISON: None available. ACCESSION NUMBER(S): [id number], [id number], [id number] ORDERING CLINICIAN: MARLENE BYRNE TECHNIQUE:  Two view(s) of the right knee.  Two views right femur-four images. Two views right tibia/fibula-four images. FINDINGS: Right Knee:  The total knee arthroplasty is in anatomic alignment. Oblique mildly displaced fracture through the distal femoral diaphysis just proximal to the femoral component of the total knee arthroplasty.  Right Femur:  There is no displaced fracture.  The alignment is anatomic.  No soft tissue abnormality is seen. Right Tibia/Fibula:  There is no displaced fracture.  The alignment is anatomic.  No soft tissue abnormality is seen.    Oblique mildly displaced fracture through the distal femoral diaphysis just proximal to the femoral component of the total knee arthroplasty. Signed by Wojciech Tubbs MD    XR tibia fibula right 2 views    Result Date: 3/7/2024  STUDY: Knee, Femur and Tibia/Fibular Radiographs; 3/7/24 at 12:09 PM INDICATION: Fall, pain. COMPARISON: None available. ACCESSION NUMBER(S): [id number], [id number], [id number] ORDERING CLINICIAN: MARLENE BYRNE TECHNIQUE:  Two view(s) of the right knee.  Two views right femur-four images. Two views right tibia/fibula-four images. FINDINGS: Right Knee:  The total knee arthroplasty is in anatomic alignment. Oblique mildly displaced fracture through the distal femoral diaphysis just proximal to the femoral component of the total knee arthroplasty.  Right Femur:  There is no displaced fracture.  The alignment is anatomic.  No soft tissue abnormality is seen. Right Tibia/Fibula:  There is no displaced fracture.  The alignment is anatomic.  No soft tissue abnormality is  seen.    Oblique mildly displaced fracture through the distal femoral diaphysis just proximal to the femoral component of the total knee arthroplasty. Signed by Wojciech Tubbs MD    XR knee right 1-2 views    Result Date: 3/7/2024  STUDY: Knee, Femur and Tibia/Fibular Radiographs; 3/7/24 at 12:09 PM INDICATION: Fall, pain. COMPARISON: None available. ACCESSION NUMBER(S): [id number], [id number], [id number] ORDERING CLINICIAN: MARLENE BYRNE TECHNIQUE:  Two view(s) of the right knee.  Two views right femur-four images. Two views right tibia/fibula-four images. FINDINGS: Right Knee:  The total knee arthroplasty is in anatomic alignment. Oblique mildly displaced fracture through the distal femoral diaphysis just proximal to the femoral component of the total knee arthroplasty.  Right Femur:  There is no displaced fracture.  The alignment is anatomic.  No soft tissue abnormality is seen. Right Tibia/Fibula:  There is no displaced fracture.  The alignment is anatomic.  No soft tissue abnormality is seen.    Oblique mildly displaced fracture through the distal femoral diaphysis just proximal to the femoral component of the total knee arthroplasty. Signed by Wojciech Tubbs MD        Assessment:      78-year-old female patient presented to [facility] with complaints of right lower extremity pain status post fall at home prior to arrival.    Imaging was reviewed and consistent with right distal femur fracture through the femoral component of previous total knee arthroplasty.  CT scan is pending at this time for further evaluation of fracture pattern.    Upon assessment patient's knee is in an immobilizer.  She reports better pain control with immobilization.  However pain is exacerbated by movement or palpation.  The extremity is neurovascular intact.     Fracture will require operative repair to include open reduction internal fixation versus intramedullary nailing.  Plan will be for surgery tomorrow.   She has been placed on the OR schedule.    Thank you for this consultation and allowing us to be a part of this patient's care.    Plan:  N.p.o. at midnight with the exception of sips of water for medication  Consent for ORIF versus IM nail right femur  Continue pain control  Strict bedrest  Knee immobilizer at all times with the exception of skin care and hygiene            Electronically signed by SHERYL Sanchez on 3/7/2024 at 1:53 PM

## 2024-03-07 NOTE — CARE PLAN
The patient's goals for the shift include pain control    The clinical goals for the shift include no falls    Over the shift, the patient did not make progress toward the following goals. Barriers to progression include ***. Recommendations to address these barriers include ***.

## 2024-03-07 NOTE — PROGRESS NOTES
03/07/24 1704   Discharge Planning   Living Arrangements Alone   Support Systems Children;Family members   Assistance Needed Prior to fall pt states completely independent in ADLS and IADLS without AD, drives, states 1 other fall when pulling weeds but denies injury, this fall pt states tripped and fell when getting up from dining room chair - resulted in right distal femur fracture. Pt owns walker, cane, crutches, walk-in shower with shower chair, toilet riser   Type of Residence Private residence  (3 level home, pt states everything needed on main level including laundry, no need to go upstairs or basement)   Number of Stairs Within Residence 13   Do you have animals or pets at home? Yes   Type of Animals or Pets 2 cats   Home or Post Acute Services In home services   Type of Home Care Services Home OT;Home PT   Patient expects to be discharged to: Home with C   Does the patient need discharge transport arranged? No   Financial Resource Strain   How hard is it for you to pay for the very basics like food, housing, medical care, and heating? Not hard   Housing Stability   In the last 12 months, was there a time when you were not able to pay the mortgage or rent on time? N   In the last 12 months, how many places have you lived? 1   In the last 12 months, was there a time when you did not have a steady place to sleep or slept in a shelter (including now)? N   Transportation Needs   In the past 12 months, has lack of transportation kept you from medical appointments or from getting medications? no   In the past 12 months, has lack of transportation kept you from meetings, work, or from getting things needed for daily living? No   Patient Choice   Provider Choice list and CMS website (https://medicare.gov/care-compare#search) for post-acute Quality and Resource Measure Data were provided and reviewed with: Patient   Patient / Family choosing to utilize agency / facility established prior to hospitalization No      Pt is from home alone, admitted after fall at home with right distal femur fracture and scheduled to go to OR tomorrow 3/8 for surgical intervention. Pt states DC preference is home with [facility], states will not go to SNF. Pt also states that her grand-daughter will stay with her to assist as needed. CT team will monitor case for progression and DC planning.

## 2024-03-07 NOTE — H&P
History Of Present Illness  Aida Baker is a 78 y.o. female presenting with history of HTN, HLD, presented to the ED after mechanical fall at home and had right knee pain. Per patient she tripped and fell at home and while getting up from a chair in her dining room. She is normally independent and has no difficulty with ambulation. In the ED she as found to have right distal femur fracture and admitted for for surgical intervention.      Past Medical History  She has a past medical history of Malignant neoplasm of parathyroid gland (CMS/HCC) and Personal history of other diseases of the circulatory system (01/10/2018).    Surgical History  She has a past surgical history that includes Colonoscopy (01/10/2018); Total knee arthroplasty (01/10/2018); Hemorrhoid surgery (01/10/2018); and Thyroid surgery (01/10/2018).     Social History  She reports that she has never smoked. She has never used smokeless tobacco. Alcohol use questions deferred to the physician. She reports that she does not use drugs.    Family History +CAD     10 point review of systems negative except per HPI      Last Recorded Vitals  /62   Pulse 92   Temp 36.9 °C (98.4 °F) (Temporal)   Resp 18   Wt 95.3 kg (210 lb)   SpO2 95%     Physical Exam   Gen: NAD  HEENT: EOM, MMM  CV: RRR, no murmurs rubs or gallops  Resp: coarse rhonchi   Abdomen: soft, NT,+BS  LE: No edema      Relevant Results  Labs Reviewed   CBC WITH AUTO DIFFERENTIAL - Abnormal       Result Value    WBC 14.1 (*)     nRBC 0.0      RBC 4.03      Hemoglobin 13.1      Hematocrit 38.2      MCV 95      MCH 32.5      MCHC 34.3      RDW 11.6      Platelets 266      Neutrophils % 84.4      Immature Granulocytes %, Automated 0.5      Lymphocytes % 7.9      Monocytes % 6.5      Eosinophils % 0.3      Basophils % 0.4      Neutrophils Absolute 11.89 (*)     Immature Granulocytes Absolute, Automated 0.07      Lymphocytes Absolute 1.11      Monocytes Absolute 0.92 (*)     Eosinophils  Absolute 0.04      Basophils Absolute 0.05     BASIC METABOLIC PANEL - Abnormal    Glucose 123 (*)     Sodium 133 (*)     Potassium 3.7      Chloride 97 (*)     Bicarbonate 25      Anion Gap 15      Urea Nitrogen 18      Creatinine 0.70      eGFR 88      Calcium 9.1     PROTIME-INR - Normal    Protime 11.5      INR 1.0     TYPE AND SCREEN    ABO TYPE O      Rh TYPE POS      ANTIBODY SCREEN NEG       XR femur right 2+ views   Final Result   Oblique mildly displaced fracture through the distal femoral diaphysis   just proximal to the femoral component of the total knee arthroplasty.   Signed by Wojciech Tubbs MD      XR tibia fibula right 2 views   Final Result   Oblique mildly displaced fracture through the distal femoral diaphysis   just proximal to the femoral component of the total knee arthroplasty.   Signed by Wojciech Tubbs MD      XR knee right 1-2 views   Final Result   Oblique mildly displaced fracture through the distal femoral diaphysis   just proximal to the femoral component of the total knee arthroplasty.   Signed by Wojciech Tubbs MD      CT femur right wo IV contrast    (Results Pending)   CT 3D reconstruction    (Results Pending)     Assessment/Plan  78 year old female admitted with right distal femur fracture after mechanical fall    -ortho consulted, plan for intervention tomorrow  -pain control  -PT/OT after surgery    HTN: resume home meds    DVT ppx: lovenox          Jewel Diaz MD

## 2024-03-08 ENCOUNTER — APPOINTMENT (OUTPATIENT)
Dept: RADIOLOGY | Facility: HOSPITAL | Age: 79
DRG: 481 | End: 2024-03-08
Payer: MEDICARE

## 2024-03-08 ENCOUNTER — ANESTHESIA (OUTPATIENT)
Dept: OPERATING ROOM | Facility: HOSPITAL | Age: 79
DRG: 481 | End: 2024-03-08
Payer: MEDICARE

## 2024-03-08 LAB
ANION GAP SERPL CALC-SCNC: 10 MMOL/L (ref 10–20)
BUN SERPL-MCNC: 16 MG/DL (ref 6–23)
CALCIUM SERPL-MCNC: 8.5 MG/DL (ref 8.6–10.3)
CHLORIDE SERPL-SCNC: 98 MMOL/L (ref 98–107)
CO2 SERPL-SCNC: 28 MMOL/L (ref 21–32)
CREAT SERPL-MCNC: 0.74 MG/DL (ref 0.5–1.05)
EGFRCR SERPLBLD CKD-EPI 2021: 82 ML/MIN/1.73M*2
ERYTHROCYTE [DISTWIDTH] IN BLOOD BY AUTOMATED COUNT: 11.6 % (ref 11.5–14.5)
GLUCOSE SERPL-MCNC: 113 MG/DL (ref 74–99)
HCT VFR BLD AUTO: 33.2 % (ref 36–46)
HGB BLD-MCNC: 11.5 G/DL (ref 12–16)
MAGNESIUM SERPL-MCNC: 1.91 MG/DL (ref 1.6–2.4)
MCH RBC QN AUTO: 33.2 PG (ref 26–34)
MCHC RBC AUTO-ENTMCNC: 34.6 G/DL (ref 32–36)
MCV RBC AUTO: 96 FL (ref 80–100)
NRBC BLD-RTO: 0 /100 WBCS (ref 0–0)
PLATELET # BLD AUTO: 229 X10*3/UL (ref 150–450)
POTASSIUM SERPL-SCNC: 3.3 MMOL/L (ref 3.5–5.3)
RBC # BLD AUTO: 3.46 X10*6/UL (ref 4–5.2)
SODIUM SERPL-SCNC: 133 MMOL/L (ref 136–145)
WBC # BLD AUTO: 7.2 X10*3/UL (ref 4.4–11.3)

## 2024-03-08 PROCEDURE — 2780000003 HC OR 278 NO HCPCS: Performed by: ORTHOPAEDIC SURGERY

## 2024-03-08 PROCEDURE — 0QS636Z REPOSITION RIGHT UPPER FEMUR WITH INTRAMEDULLARY INTERNAL FIXATION DEVICE, PERCUTANEOUS APPROACH: ICD-10-PCS | Performed by: ORTHOPAEDIC SURGERY

## 2024-03-08 PROCEDURE — 3600000009 HC OR TIME - EACH INCREMENTAL 1 MINUTE - PROCEDURE LEVEL FOUR: Performed by: ORTHOPAEDIC SURGERY

## 2024-03-08 PROCEDURE — 2500000004 HC RX 250 GENERAL PHARMACY W/ HCPCS (ALT 636 FOR OP/ED): Performed by: ANESTHESIOLOGY

## 2024-03-08 PROCEDURE — 7100000001 HC RECOVERY ROOM TIME - INITIAL BASE CHARGE: Performed by: ORTHOPAEDIC SURGERY

## 2024-03-08 PROCEDURE — 1100000001 HC PRIVATE ROOM DAILY

## 2024-03-08 PROCEDURE — 27506 TREATMENT OF THIGH FRACTURE: CPT | Performed by: PHYSICIAN ASSISTANT

## 2024-03-08 PROCEDURE — 2500000005 HC RX 250 GENERAL PHARMACY W/O HCPCS: Performed by: NURSE ANESTHETIST, CERTIFIED REGISTERED

## 2024-03-08 PROCEDURE — 99232 SBSQ HOSP IP/OBS MODERATE 35: CPT | Performed by: HOSPITALIST

## 2024-03-08 PROCEDURE — 2500000004 HC RX 250 GENERAL PHARMACY W/ HCPCS (ALT 636 FOR OP/ED): Performed by: HOSPITALIST

## 2024-03-08 PROCEDURE — 36415 COLL VENOUS BLD VENIPUNCTURE: CPT | Performed by: HOSPITALIST

## 2024-03-08 PROCEDURE — 99223 1ST HOSP IP/OBS HIGH 75: CPT | Performed by: ORTHOPAEDIC SURGERY

## 2024-03-08 PROCEDURE — 51701 INSERT BLADDER CATHETER: CPT

## 2024-03-08 PROCEDURE — 80048 BASIC METABOLIC PNL TOTAL CA: CPT | Performed by: HOSPITALIST

## 2024-03-08 PROCEDURE — 83735 ASSAY OF MAGNESIUM: CPT | Performed by: HOSPITALIST

## 2024-03-08 PROCEDURE — 85027 COMPLETE CBC AUTOMATED: CPT | Performed by: HOSPITALIST

## 2024-03-08 PROCEDURE — C1769 GUIDE WIRE: HCPCS | Performed by: ORTHOPAEDIC SURGERY

## 2024-03-08 PROCEDURE — 2500000004 HC RX 250 GENERAL PHARMACY W/ HCPCS (ALT 636 FOR OP/ED): Performed by: NURSE PRACTITIONER

## 2024-03-08 PROCEDURE — 7100000002 HC RECOVERY ROOM TIME - EACH INCREMENTAL 1 MINUTE: Performed by: ORTHOPAEDIC SURGERY

## 2024-03-08 PROCEDURE — 27506 TREATMENT OF THIGH FRACTURE: CPT | Performed by: ORTHOPAEDIC SURGERY

## 2024-03-08 PROCEDURE — 2720000007 HC OR 272 NO HCPCS: Performed by: ORTHOPAEDIC SURGERY

## 2024-03-08 PROCEDURE — 2500000004 HC RX 250 GENERAL PHARMACY W/ HCPCS (ALT 636 FOR OP/ED): Performed by: NURSE ANESTHETIST, CERTIFIED REGISTERED

## 2024-03-08 PROCEDURE — 3600000004 HC OR TIME - INITIAL BASE CHARGE - PROCEDURE LEVEL FOUR: Performed by: ORTHOPAEDIC SURGERY

## 2024-03-08 PROCEDURE — 3700000001 HC GENERAL ANESTHESIA TIME - INITIAL BASE CHARGE: Performed by: ORTHOPAEDIC SURGERY

## 2024-03-08 PROCEDURE — 2500000004 HC RX 250 GENERAL PHARMACY W/ HCPCS (ALT 636 FOR OP/ED): Performed by: REGISTERED NURSE

## 2024-03-08 PROCEDURE — 3700000002 HC GENERAL ANESTHESIA TIME - EACH INCREMENTAL 1 MINUTE: Performed by: ORTHOPAEDIC SURGERY

## 2024-03-08 PROCEDURE — 2500000001 HC RX 250 WO HCPCS SELF ADMINISTERED DRUGS (ALT 637 FOR MEDICARE OP): Performed by: HOSPITALIST

## 2024-03-08 DEVICE — IMPLANTABLE DEVICE: Type: IMPLANTABLE DEVICE | Site: HIP | Status: FUNCTIONAL

## 2024-03-08 RX ORDER — PHENYLEPHRINE HCL IN 0.9% NACL 1 MG/10 ML
SYRINGE (ML) INTRAVENOUS AS NEEDED
Status: DISCONTINUED | OUTPATIENT
Start: 2024-03-08 | End: 2024-03-08

## 2024-03-08 RX ORDER — FENTANYL CITRATE 50 UG/ML
INJECTION, SOLUTION INTRAMUSCULAR; INTRAVENOUS AS NEEDED
Status: DISCONTINUED | OUTPATIENT
Start: 2024-03-08 | End: 2024-03-08

## 2024-03-08 RX ORDER — NORETHINDRONE AND ETHINYL ESTRADIOL 0.5-0.035
KIT ORAL AS NEEDED
Status: DISCONTINUED | OUTPATIENT
Start: 2024-03-08 | End: 2024-03-08

## 2024-03-08 RX ORDER — CEFAZOLIN SODIUM 2 G/100ML
2 INJECTION, SOLUTION INTRAVENOUS EVERY 8 HOURS
Status: COMPLETED | OUTPATIENT
Start: 2024-03-08 | End: 2024-03-09

## 2024-03-08 RX ORDER — CEFAZOLIN SODIUM 2 G/100ML
2 INJECTION, SOLUTION INTRAVENOUS ONCE
Status: COMPLETED | OUTPATIENT
Start: 2024-03-08 | End: 2024-03-08

## 2024-03-08 RX ORDER — DEXAMETHASONE SODIUM PHOSPHATE 4 MG/ML
INJECTION, SOLUTION INTRA-ARTICULAR; INTRALESIONAL; INTRAMUSCULAR; INTRAVENOUS; SOFT TISSUE AS NEEDED
Status: DISCONTINUED | OUTPATIENT
Start: 2024-03-08 | End: 2024-03-08

## 2024-03-08 RX ORDER — MIDAZOLAM HYDROCHLORIDE 1 MG/ML
INJECTION, SOLUTION INTRAMUSCULAR; INTRAVENOUS AS NEEDED
Status: DISCONTINUED | OUTPATIENT
Start: 2024-03-08 | End: 2024-03-08

## 2024-03-08 RX ORDER — PROPOFOL 10 MG/ML
INJECTION, EMULSION INTRAVENOUS AS NEEDED
Status: DISCONTINUED | OUTPATIENT
Start: 2024-03-08 | End: 2024-03-08

## 2024-03-08 RX ORDER — ROCURONIUM BROMIDE 10 MG/ML
INJECTION, SOLUTION INTRAVENOUS AS NEEDED
Status: DISCONTINUED | OUTPATIENT
Start: 2024-03-08 | End: 2024-03-08

## 2024-03-08 RX ORDER — FAMOTIDINE 10 MG/ML
INJECTION INTRAVENOUS AS NEEDED
Status: DISCONTINUED | OUTPATIENT
Start: 2024-03-08 | End: 2024-03-08

## 2024-03-08 RX ORDER — SODIUM CHLORIDE, SODIUM LACTATE, POTASSIUM CHLORIDE, CALCIUM CHLORIDE 600; 310; 30; 20 MG/100ML; MG/100ML; MG/100ML; MG/100ML
INJECTION, SOLUTION INTRAVENOUS CONTINUOUS PRN
Status: DISCONTINUED | OUTPATIENT
Start: 2024-03-08 | End: 2024-03-08

## 2024-03-08 RX ORDER — GLYCOPYRROLATE 0.2 MG/ML
INJECTION INTRAMUSCULAR; INTRAVENOUS AS NEEDED
Status: DISCONTINUED | OUTPATIENT
Start: 2024-03-08 | End: 2024-03-08

## 2024-03-08 RX ADMIN — Medication 200 MCG: at 14:13

## 2024-03-08 RX ADMIN — CEFAZOLIN SODIUM 2 G: 2 INJECTION, SOLUTION INTRAVENOUS at 22:41

## 2024-03-08 RX ADMIN — Medication 200 MCG: at 14:03

## 2024-03-08 RX ADMIN — FENTANYL CITRATE 100 MCG: 50 INJECTION, SOLUTION INTRAMUSCULAR; INTRAVENOUS at 15:09

## 2024-03-08 RX ADMIN — SUGAMMADEX 200 MG: 100 INJECTION, SOLUTION INTRAVENOUS at 15:08

## 2024-03-08 RX ADMIN — ONDANSETRON 4 MG: 2 INJECTION, SOLUTION INTRAMUSCULAR; INTRAVENOUS at 13:13

## 2024-03-08 RX ADMIN — LOSARTAN POTASSIUM: 100 TABLET, FILM COATED ORAL at 09:06

## 2024-03-08 RX ADMIN — MORPHINE SULFATE 4 MG: 4 INJECTION, SOLUTION INTRAMUSCULAR; INTRAVENOUS at 09:07

## 2024-03-08 RX ADMIN — PROPOFOL 200 MG: 10 INJECTION, EMULSION INTRAVENOUS at 13:13

## 2024-03-08 RX ADMIN — GLYCOPYRROLATE 0.4 MG: 0.2 INJECTION, SOLUTION INTRAMUSCULAR; INTRAVENOUS at 13:40

## 2024-03-08 RX ADMIN — GLYCOPYRROLATE 0.6 MG: 0.2 INJECTION, SOLUTION INTRAMUSCULAR; INTRAVENOUS at 13:36

## 2024-03-08 RX ADMIN — FENTANYL CITRATE 50 MCG: 50 INJECTION, SOLUTION INTRAMUSCULAR; INTRAVENOUS at 14:03

## 2024-03-08 RX ADMIN — Medication 200 MCG: at 13:53

## 2024-03-08 RX ADMIN — MIDAZOLAM 2 MG: 1 INJECTION INTRAMUSCULAR; INTRAVENOUS at 12:40

## 2024-03-08 RX ADMIN — DEXAMETHASONE SODIUM PHOSPHATE 8 MG: 4 INJECTION, SOLUTION INTRAMUSCULAR; INTRAVENOUS at 13:13

## 2024-03-08 RX ADMIN — FAMOTIDINE 20 MG: 10 INJECTION, SOLUTION INTRAVENOUS at 13:13

## 2024-03-08 RX ADMIN — CEFAZOLIN SODIUM 2 G: 2 INJECTION, SOLUTION INTRAVENOUS at 13:18

## 2024-03-08 RX ADMIN — ROCURONIUM BROMIDE 50 MG: 10 SOLUTION INTRAVENOUS at 13:13

## 2024-03-08 RX ADMIN — SODIUM CHLORIDE 75 ML/HR: 9 INJECTION, SOLUTION INTRAVENOUS at 00:07

## 2024-03-08 RX ADMIN — SODIUM CHLORIDE, SODIUM LACTATE, POTASSIUM CHLORIDE, AND CALCIUM CHLORIDE: 600; 310; 30; 20 INJECTION, SOLUTION INTRAVENOUS at 14:25

## 2024-03-08 RX ADMIN — FENTANYL CITRATE 50 MCG: 50 INJECTION, SOLUTION INTRAMUSCULAR; INTRAVENOUS at 14:40

## 2024-03-08 RX ADMIN — FENTANYL CITRATE 50 MCG: 50 INJECTION, SOLUTION INTRAMUSCULAR; INTRAVENOUS at 13:29

## 2024-03-08 RX ADMIN — Medication 200 MCG: at 14:22

## 2024-03-08 RX ADMIN — Medication 200 MCG: at 13:43

## 2024-03-08 RX ADMIN — SODIUM CHLORIDE 75 ML/HR: 9 INJECTION, SOLUTION INTRAVENOUS at 22:41

## 2024-03-08 RX ADMIN — MORPHINE SULFATE 4 MG: 4 INJECTION, SOLUTION INTRAMUSCULAR; INTRAVENOUS at 03:33

## 2024-03-08 RX ADMIN — EPHEDRINE SULFATE 20 MG: 50 INJECTION, SOLUTION INTRAVENOUS at 14:24

## 2024-03-08 RX ADMIN — SODIUM CHLORIDE, SODIUM LACTATE, POTASSIUM CHLORIDE, AND CALCIUM CHLORIDE: 600; 310; 30; 20 INJECTION, SOLUTION INTRAVENOUS at 13:11

## 2024-03-08 SDOH — HEALTH STABILITY: MENTAL HEALTH: CURRENT SMOKER: 0

## 2024-03-08 ASSESSMENT — COGNITIVE AND FUNCTIONAL STATUS - GENERAL
MOBILITY SCORE: 8
WALKING IN HOSPITAL ROOM: TOTAL
TOILETING: TOTAL
DRESSING REGULAR UPPER BODY CLOTHING: A LOT
TURNING FROM BACK TO SIDE WHILE IN FLAT BAD: A LOT
DRESSING REGULAR LOWER BODY CLOTHING: A LOT
MOVING TO AND FROM BED TO CHAIR: TOTAL
HELP NEEDED FOR BATHING: A LOT
CLIMB 3 TO 5 STEPS WITH RAILING: TOTAL
MOVING FROM LYING ON BACK TO SITTING ON SIDE OF FLAT BED WITH BEDRAILS: A LOT
EATING MEALS: A LITTLE
PERSONAL GROOMING: A LOT
DAILY ACTIVITIY SCORE: 12
STANDING UP FROM CHAIR USING ARMS: TOTAL

## 2024-03-08 ASSESSMENT — PAIN SCALES - GENERAL
PAINLEVEL_OUTOF10: 8
PAINLEVEL_OUTOF10: 8
PAINLEVEL_OUTOF10: 4
PAINLEVEL_OUTOF10: 0 - NO PAIN
PAINLEVEL_OUTOF10: 8
PAINLEVEL_OUTOF10: 0 - NO PAIN
PAINLEVEL_OUTOF10: 3
PAINLEVEL_OUTOF10: 10 - WORST POSSIBLE PAIN
PAINLEVEL_OUTOF10: 0 - NO PAIN
PAIN_LEVEL: 2
PAINLEVEL_OUTOF10: 0 - NO PAIN
PAINLEVEL_OUTOF10: 0 - NO PAIN

## 2024-03-08 ASSESSMENT — ENCOUNTER SYMPTOMS
SHORTNESS OF BREATH: 0
GASTROINTESTINAL NEGATIVE: 1
PSYCHIATRIC NEGATIVE: 1
ACTIVITY CHANGE: 0
CONSTITUTIONAL NEGATIVE: 1
HEMATOLOGIC/LYMPHATIC NEGATIVE: 1
EYES NEGATIVE: 1
ENDOCRINE NEGATIVE: 1
ALLERGIC/IMMUNOLOGIC NEGATIVE: 1
ARTHRALGIAS: 1
FEVER: 0

## 2024-03-08 ASSESSMENT — PAIN - FUNCTIONAL ASSESSMENT
PAIN_FUNCTIONAL_ASSESSMENT: 0-10

## 2024-03-08 ASSESSMENT — PAIN DESCRIPTION - LOCATION: LOCATION: LEG

## 2024-03-08 ASSESSMENT — PAIN DESCRIPTION - ORIENTATION: ORIENTATION: RIGHT

## 2024-03-08 ASSESSMENT — PAIN DESCRIPTION - DESCRIPTORS: DESCRIPTORS: ACHING;SHARP

## 2024-03-08 NOTE — CARE PLAN
The patient's goals for the shift include pain control    The clinical goals for the shift include pain control, hemodynamically stable

## 2024-03-08 NOTE — PROGRESS NOTES
"Aida Baker is a 78 y.o. female on day 1 of admission presenting with   right distal femur fracture after mechanical fall       Subjective : Waiting for surgery this AM     Objective     Last Recorded Vitals  /67   Pulse 71   Temp 36.8 °C (98.2 °F) (Temporal)   Resp 20   Ht 1.702 m (5' 7\")   Wt 98.8 kg (217 lb 13 oz)   SpO2 94%   BMI 34.11 kg/m²      Intake/Output last 3 Shifts:    Intake/Output Summary (Last 24 hours) at 3/8/2024 1501  Last data filed at 3/8/2024 1441  Gross per 24 hour   Intake 1735 ml   Output 400 ml   Net 1335 ml       Physical Exam   Gen: NAD  HEENT: EOM, MMM  CV: RRR, no murmurs rubs or gallops  Resp: coarse rhonchi b/l   Abdomen: soft, NT,+BS  LE: No edema      Relevant Results  Lab Results   Component Value Date    WBC 7.2 03/08/2024    HGB 11.5 (L) 03/08/2024    HCT 33.2 (L) 03/08/2024    MCV 96 03/08/2024     03/08/2024     Lab Results   Component Value Date    GLUCOSE 113 (H) 03/08/2024    CALCIUM 8.5 (L) 03/08/2024     (L) 03/08/2024    K 3.3 (L) 03/08/2024    CO2 28 03/08/2024    CL 98 03/08/2024    BUN 16 03/08/2024    CREATININE 0.74 03/08/2024     Assessment/Plan  78 year old female admitted with right distal femur fracture after mechanical fall     -ortho consulted, plan for surgery today  -medically cleared for surgery   -pain control  -PT/OT after surgery     HTN: resume home meds     DVT ppx: lovenox        Jewel Diaz MD      "

## 2024-03-08 NOTE — ANESTHESIA PROCEDURE NOTES
Airway  Date/Time: 3/8/2024 1:13 PM  Urgency: elective    Airway not difficult    Staffing  Performed: CRNA   Authorized by: Bj Gann MD    Performed by: NAYELI Maurice-DARA  Patient location during procedure: OR    Indications and Patient Condition  Indications for airway management: anesthesia  Spontaneous Ventilation: absent  Sedation level: deep  Preoxygenated: yes  Patient position: sniffing  MILS maintained throughout  Mask difficulty assessment: 0 - not attempted  Planned trial extubation    Final Airway Details  Final airway type: endotracheal airway      Successful airway: ETT  Cuffed: yes   Successful intubation technique: video laryngoscopy  Facilitating devices/methods: intubating stylet  Endotracheal tube insertion site: oral  Blade size: #4  ETT size (mm): 7.0  Cormack-Lehane Classification: grade IIa - partial view of glottis  Placement verified by: chest auscultation and capnometry   Cuff volume (mL): 7  Measured from: lips  ETT to lips (cm): 22  Number of attempts at approach: 1  Ventilation between attempts: BVM  Number of other approaches attempted: 0

## 2024-03-08 NOTE — ANESTHESIA PREPROCEDURE EVALUATION
Aida Baker is a 78 y.o. female here for:      Open Reduction Internal Fixation Femur synthese plates and screws and intramedullary nail  With Walter Jauregui DO  Pre-Op Diagnosis Codes:     * Nondisplaced spiral fracture of shaft of right femur, initial encounter for closed fracture [S72.344A]    Lab Results   Component Value Date    HGB 11.5 (L) 03/08/2024    HCT 33.2 (L) 03/08/2024    WBC 7.2 03/08/2024     03/08/2024     (L) 03/08/2024    K 3.3 (L) 03/08/2024    CL 98 03/08/2024    CREATININE 0.74 03/08/2024    BUN 16 03/08/2024       Social History     Substance and Sexual Activity   Drug Use Never      Tobacco Use: Low Risk  (3/7/2024)    Patient History    • Smoking Tobacco Use: Never    • Smokeless Tobacco Use: Never    • Passive Exposure: Not on file      Social History     Substance and Sexual Activity   Alcohol Use Defer        Allergies   Allergen Reactions   • Niacin Hives       Current Outpatient Medications   Medication Instructions   • ALPRAZolam (XANAX) 0.25 mg, oral, Nightly PRN   • amLODIPine (Norvasc) 5 mg tablet 1 tablet, oral, Nightly   • losartan-hydrochlorothiazide (Hyzaar) 100-12.5 mg tablet 1 tablet, oral, Nightly   • NON FORMULARY 1 each, oral, Daily, Collagen peptide powder<BR>1-tablespoon   • NON FORMULARY 1 each, oral, Daily, Benefiber powder<BR>1 tablespoon <BR><BR>   • NON FORMULARY 2 each, oral, Nightly, Relaxium capsule<BR>   • NON FORMULARY 1 each, oral, Nightly, Peptiva capsule   • vitamins B1 B6 B12 liquid 1 mL, oral, 2 times daily       Past Medical History:   Diagnosis Date   • 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    Relevant Problems   No relevant active problems       Visit Vitals  /66 (BP Location: Right arm, Patient Position: Lying)   Pulse 75   Temp 36.8 °C (98.2 °F) (Temporal)   Resp 19   Ht 1.702 m (5' 7\")   Wt 98.8 kg (217 lb 13 oz)   SpO2 96%   BMI 34.11 kg/m²   Smoking Status Never   BSA 2.16 m²       NPO Details:  No data recorded    Physical Exam    Airway  Mallampati: II     Cardiovascular - normal exam     Dental - normal exam     Pulmonary - normal exam     Abdominal   (+) obese  Abdomen: soft           Anesthesia Plan    History of general anesthesia?: yes  History of complications of general anesthesia?: no    ASA 3 - emergent     general and regional     The patient is not a current smoker.  Patient did not smoke on day of procedure.    intravenous induction   Anesthetic plan and risks discussed with patient.  Use of blood products discussed with patient who.    Plan discussed with CRNA.    "

## 2024-03-08 NOTE — CONSULTS
Consults      Consult Note  Patient: Aida Baker  Unit/Bed: 620/620-A  YOB: 1945  MRN: [id number]  Acct: [id number]   Admitting Diagnosis: Nondisplaced spiral fracture of shaft of right femur, initial encounter for closed fracture (CMS/[facility]) [S72.344A]  Date:  3/7/2024  Hospital Day: 1    Complaint:  Right leg pain    History of Present Illness:  Aida Baker is a 78-year-old female patient per chart review with history of parathyroid carcinoma and hypertension who presented to [facility] emergency room status post mechanical fall at home prior to arrival.  She reports standing up from her dining room table when her pant leg got caught causing her to twist her right lower extremity causing her to fall to the ground.  She denies hitting her head or loss of consciousness.  She was unable to get up or ambulate due to pain therefore was brought in via EMS.  Imaging performed in the emergency room showed patient to have a mildly displaced distal femur fracture to the femoral component of the total knee arthroplasty in which orthopedics was consulted for evaluation and treatment recommendations.        PMHx:  Past Medical History:   Diagnosis Date    Malignant neoplasm of parathyroid gland (CMS/[facility])     Parathyroid carcinoma    Personal history of other diseases of the circulatory system 01/10/2018    History of hypertension       PSHx:  Past Surgical History:   Procedure Laterality Date    COLONOSCOPY  01/10/2018    Complete Colonoscopy    HEMORRHOID SURGERY  01/10/2018    Hemorrhoidectomy    THYROID SURGERY  01/10/2018    Thyroid Surgery    TOTAL KNEE ARTHROPLASTY  01/10/2018    Knee Replacement       Social Hx:  Social History     Socioeconomic History    Marital status:      Spouse name: None    Number of children: None    Years of education: None    Highest education level: None   Occupational History    None   Tobacco Use    Smoking status: Never    Smokeless tobacco: Never    Vaping Use    Vaping Use: Never used   Substance and Sexual Activity    Alcohol use: Defer    Drug use: Never    Sexual activity: Defer   Other Topics Concern    None   Social History Narrative    None     Social Determinants of Health     Financial Resource Strain: Low Risk  (3/7/2024)    Overall Financial Resource Strain (CARDIA)     Difficulty of Paying Living Expenses: Not hard at all   Food Insecurity: Not on file   Transportation Needs: No Transportation Needs (3/7/2024)    PRAPARE - Transportation     Lack of Transportation (Medical): No     Lack of Transportation (Non-Medical): No   Physical Activity: Not on file   Stress: Not on file   Social Connections: Not on file   Intimate Partner Violence: Not on file   Housing Stability: Low Risk  (3/7/2024)    Housing Stability Vital Sign     Unable to Pay for Housing in the Last Year: No     Number of Places Lived in the Last Year: 1     Unstable Housing in the Last Year: No       Family Hx:  No family history on file.    Review of Systems:   Review of Systems   Constitutional: Negative.  Negative for activity change and fever.   HENT: Negative.     Eyes: Negative.    Respiratory:  Negative for shortness of breath.    Cardiovascular:  Positive for leg swelling. Negative for chest pain.   Gastrointestinal: Negative.    Endocrine: Negative.    Genitourinary: Negative.    Musculoskeletal:  Positive for arthralgias and gait problem.   Skin: Negative.    Allergic/Immunologic: Negative.    Hematological: Negative.    Psychiatric/Behavioral: Negative.     All other systems reviewed and are negative.        Physical Examination:    Visit Vitals  /66 (BP Location: Left arm, Patient Position: Lying)   Pulse 76   Temp 37.7 °C (99.9 °F) (Temporal)   Resp 18      Physical Exam  Vitals and nursing note reviewed.   Constitutional:       General: She is not in acute distress.     Appearance: Normal appearance.   HENT:      Head: Normocephalic.      Nose: Nose normal.       Mouth/Throat:      Mouth: Mucous membranes are moist.   Eyes:      Extraocular Movements: Extraocular movements intact.      Pupils: Pupils are equal, round, and reactive to light.   Cardiovascular:      Rate and Rhythm: Normal rate and regular rhythm.      Pulses: Normal pulses.      Heart sounds: Normal heart sounds.   Pulmonary:      Effort: Pulmonary effort is normal.      Breath sounds: Normal breath sounds.   Abdominal:      General: Bowel sounds are normal.      Palpations: Abdomen is soft.   Musculoskeletal:         General: Tenderness, deformity and signs of injury present.      Cervical back: Normal range of motion. No rigidity or tenderness.   Skin:     General: Skin is warm.      Capillary Refill: Capillary refill takes less than 2 seconds.   Neurological:      General: No focal deficit present.      Mental Status: She is alert and oriented to person, place, and time.   Psychiatric:         Mood and Affect: Mood normal.         LABS:  CBC:   Lab Results   Component Value Date    WBC 7.2 03/08/2024    RBC 3.46 (L) 03/08/2024    HGB 11.5 (L) 03/08/2024    HCT 33.2 (L) 03/08/2024    MCV 96 03/08/2024    MCH 33.2 03/08/2024    MCHC 34.6 03/08/2024    RDW 11.6 03/08/2024     03/08/2024     CBC with Differential:    Lab Results   Component Value Date    WBC 7.2 03/08/2024    RBC 3.46 (L) 03/08/2024    HGB 11.5 (L) 03/08/2024    HCT 33.2 (L) 03/08/2024     03/08/2024    MCV 96 03/08/2024    MCH 33.2 03/08/2024    MCHC 34.6 03/08/2024    RDW 11.6 03/08/2024    NRBC 0.0 03/08/2024    LYMPHOPCT 7.9 03/07/2024    MONOPCT 6.5 03/07/2024    EOSPCT 0.3 03/07/2024    BASOPCT 0.4 03/07/2024    MONOSABS 0.92 (H) 03/07/2024    LYMPHSABS 1.11 03/07/2024    EOSABS 0.04 03/07/2024    BASOSABS 0.05 03/07/2024     CMP:    Lab Results   Component Value Date     (L) 03/08/2024    K 3.3 (L) 03/08/2024    CL 98 03/08/2024    CO2 28 03/08/2024    BUN 16 03/08/2024    CREATININE 0.74 03/08/2024    GLUCOSE 113 (H)  "03/08/2024    CALCIUM 8.5 (L) 03/08/2024     BMP:    Lab Results   Component Value Date     (L) 03/08/2024    K 3.3 (L) 03/08/2024    CL 98 03/08/2024    CO2 28 03/08/2024    BUN 16 03/08/2024    CREATININE 0.74 03/08/2024    CALCIUM 8.5 (L) 03/08/2024    GLUCOSE 113 (H) 03/08/2024     Magnesium:  Lab Results   Component Value Date    MG 1.91 03/08/2024     Troponin:  No results found for: \"TROPONINI\"    Imaging   ECG 12 lead    Result Date: 3/7/2024  Sinus rhythm with Premature atrial complexes Right bundle branch block Abnormal ECG When compared with ECG of 04-MAY-2004 11:32, Premature atrial complexes are now Present Right bundle branch block is now Present    XR femur right 2+ views    Result Date: 3/7/2024  STUDY: Knee, Femur and Tibia/Fibular Radiographs; 3/7/24 at 12:09 PM INDICATION: Fall, pain. COMPARISON: None available. ACCESSION NUMBER(S): [id number], [id number], [id number] ORDERING CLINICIAN: MARLENE BYRNE TECHNIQUE:  Two view(s) of the right knee.  Two views right femur-four images. Two views right tibia/fibula-four images. FINDINGS: Right Knee:  The total knee arthroplasty is in anatomic alignment. Oblique mildly displaced fracture through the distal femoral diaphysis just proximal to the femoral component of the total knee arthroplasty.  Right Femur:  There is no displaced fracture.  The alignment is anatomic.  No soft tissue abnormality is seen. Right Tibia/Fibula:  There is no displaced fracture.  The alignment is anatomic.  No soft tissue abnormality is seen.    Oblique mildly displaced fracture through the distal femoral diaphysis just proximal to the femoral component of the total knee arthroplasty. Signed by Wojciech Tubbs MD    XR tibia fibula right 2 views    Result Date: 3/7/2024  STUDY: Knee, Femur and Tibia/Fibular Radiographs; 3/7/24 at 12:09 PM INDICATION: Fall, pain. COMPARISON: None available. ACCESSION NUMBER(S): [id number], [id number], [id number] ORDERING " CLINICIAN: MARLENE BYRNE TECHNIQUE:  Two view(s) of the right knee.  Two views right femur-four images. Two views right tibia/fibula-four images. FINDINGS: Right Knee:  The total knee arthroplasty is in anatomic alignment. Oblique mildly displaced fracture through the distal femoral diaphysis just proximal to the femoral component of the total knee arthroplasty.  Right Femur:  There is no displaced fracture.  The alignment is anatomic.  No soft tissue abnormality is seen. Right Tibia/Fibula:  There is no displaced fracture.  The alignment is anatomic.  No soft tissue abnormality is seen.    Oblique mildly displaced fracture through the distal femoral diaphysis just proximal to the femoral component of the total knee arthroplasty. Signed by Wojciech Tubbs MD    XR knee right 1-2 views    Result Date: 3/7/2024  STUDY: Knee, Femur and Tibia/Fibular Radiographs; 3/7/24 at 12:09 PM INDICATION: Fall, pain. COMPARISON: None available. ACCESSION NUMBER(S): [id number], [id number], [id number] ORDERING CLINICIAN: MARLENE BYRNE TECHNIQUE:  Two view(s) of the right knee.  Two views right femur-four images. Two views right tibia/fibula-four images. FINDINGS: Right Knee:  The total knee arthroplasty is in anatomic alignment. Oblique mildly displaced fracture through the distal femoral diaphysis just proximal to the femoral component of the total knee arthroplasty.  Right Femur:  There is no displaced fracture.  The alignment is anatomic.  No soft tissue abnormality is seen. Right Tibia/Fibula:  There is no displaced fracture.  The alignment is anatomic.  No soft tissue abnormality is seen.    Oblique mildly displaced fracture through the distal femoral diaphysis just proximal to the femoral component of the total knee arthroplasty. Signed by Wojciech Tubbs MD        Assessment:      78-year-old female patient presented to [facility] with complaints of right lower extremity pain status post fall at home prior  to arrival.    Imaging was reviewed and consistent with right distal femur fracture through the femoral component of previous total knee arthroplasty.  CT scan is pending at this time for further evaluation of fracture pattern.    Upon assessment patient's knee is in an immobilizer.  She reports better pain control with immobilization.  However pain is exacerbated by movement or palpation.  The extremity is neurovascular intact.     Fracture will require operative repair to include open reduction internal fixation versus intramedullary nailing.  Plan will be for surgery tomorrow.  She has been placed on the OR schedule.    Thank you for this consultation and allowing us to be a part of this patient's care.    Plan:  N.p.o.   Consent for ORIF versus IM nail right femur  Continue pain control  Strict bedrest  Knee immobilizer at all times with the exception of skin care and hygiene            Electronically signed by SHERYL Sanchez on 3/8/2024 at 6:37 AM        In a face to face encounter, I performed a history and physical examination, discussed pertinent diagnostic studies if indicated, and discussed diagnosis and management strategies with both the patient and the mid-level provider. I reviewed the mid-level's note and agree with the documented findings and plan of care.  Patient sustained mechanical fall injuring the right thigh.  X-rays demonstrate femoral shaft fracture involving middle and distal third approaching the femoral component.  CT demonstrates femoral component to be stable.  Adequate bone stock distally to allow for femoral nailing.  Plan for operative stabilization right femur fracture by way of intramedullary nail fixation later today.  Recommend for medical optimization and surgical clearance.

## 2024-03-08 NOTE — ANESTHESIA POSTPROCEDURE EVALUATION
Patient: Aida Baker    Procedure Summary       Date: 03/08/24 Room / Location: [facility] OR 05 / [facility]    Anesthesia Start: 1311 Anesthesia Stop: 1513    Procedure: Open Reduction Internal Fixation Femur (Right: Hip) Diagnosis:       Nondisplaced spiral fracture of shaft of right femur, initial encounter for closed fracture (CMS/HCC)      (Nondisplaced spiral fracture of shaft of right femur, initial encounter for closed fracture (CMS/[facility]) [S72.344A])    Surgeons: Walter Jauregui DO Responsible Provider: Bj Gann MD    Anesthesia Type: general, regional ASA Status: 3 - Emergent            Anesthesia Type: general, regional    Vitals Value Taken Time   /83 03/08/24 1514   Temp 36.5 03/08/24 1514   Pulse 74 03/08/24 1514   Resp 18 03/08/24 1514   SpO2 100 03/08/24 1514       Anesthesia Post Evaluation    Patient location during evaluation: PACU  Patient participation: complete - patient participated  Level of consciousness: awake and alert  Pain score: 2  Pain management: adequate  Airway patency: patent  Cardiovascular status: acceptable and stable  Respiratory status: face mask and acceptable  Hydration status: acceptable  Postoperative Nausea and Vomiting: none      No notable events documented.

## 2024-03-08 NOTE — CARE PLAN
Problem: Fall/Injury  Goal: Not fall by end of shift  Outcome: Progressing  Goal: Be free from injury by end of the shift  Outcome: Progressing  Goal: Verbalize understanding of personal risk factors for fall in the hospital  Outcome: Progressing  Goal: Verbalize understanding of risk factor reduction measures to prevent injury from fall in the home  Outcome: Progressing  Goal: Use assistive devices by end of the shift  Outcome: Progressing  Goal: Pace activities to prevent fatigue by end of the shift  Outcome: Progressing     Problem: Safety - Adult  Goal: Free from fall injury  Outcome: Progressing     Problem: Discharge Planning  Goal: Discharge to home or other facility with appropriate resources  Outcome: Progressing     Problem: Skin  Goal: Decreased wound size/increased tissue granulation at next dressing change  Outcome: Progressing  Goal: Participates in plan/prevention/treatment measures  Outcome: Progressing  Goal: Prevent/manage excess moisture  Outcome: Progressing  Goal: Prevent/minimize sheer/friction injuries  Outcome: Progressing  Goal: Promote/optimize nutrition  Outcome: Progressing  Goal: Promote skin healing  Outcome: Progressing      The patient's goals for the shift include pain control    The clinical goals for the shift include pain control, hemodynamically stable        Problem: Pain  Goal: Turns in bed with improved pain control throughout the shift  Outcome: Not Progressing  Goal: Walks with improved pain control throughout the shift  Outcome: Not Progressing  Goal: Performs ADL's with improved pain control throughout shift  Outcome: Not Progressing  Goal: Participates in PT with improved pain control throughout the shift  Outcome: Not Progressing

## 2024-03-08 NOTE — OP NOTE
Open Reduction Internal Fixation Femur (R) Operative Note     Date: 3/7/2024 - 3/8/2024  OR Location: [facility]    Name: Aida Baker, : 1945, Age: 78 y.o., MRN: [id number], Sex: female    Preoperative diagnosis: Displaced right femoral shaft fracture    Postoperative diagnosis: Same    Procedure planned: Retrograde femoral nailing displaced right femoral shaft fracture    Procedure performed: Same    Surgeon: Walter Jauregui D.O.    Assistant: IRENE Taveras.  IRENE Coppola.  The physician assistant was present to the entire case. Given the nature of the disease process and the procedure to be performed a skilled surgical assistant was necessary during the case. The assistant was necessary in order to hold retractors and directly assist in the operation. A certified scrub tech was at the back table managing instruments and supplies for the surgical case.    Anesthesia: General    Estimated blood loss: Approximately 50 cc    Drains: None    Tourniquet: None    Specimens: None    Implants: Synthes    Indications for procedure: The patient sustained injury to the right thigh.  She sustained a right femoral shaft fracture that extended to the supracondylar region terminating just above a femoral total knee arthroplasty component.  Treatment options were discussed including both operative and nonoperative strategies.  Recommendations were made for operative stabilization by way of retrograde femoral nailing.  After full discussion regarding risks benefits and alternatives the patient elected to proceed forth with surgery.  Informed consent was signed and placed in the chart.    Complications: None noted at the time of surgery    Description of procedure: The patient was taken to the operative suite and placed in the supine position on the operating table.  A timeout was performed and the right thigh confirmed to be the operative site.  She was carefully positioned on the table in such a fashion as to  pad all bony prominences and peripheral nerves.  She was administered appropriate IV antibiotics and general anesthesia.  She was prepped and draped in the normal sterile fashion.  Fluoroscopic imaging was brought in.  Triangle positioner along with well-placed bumps were used to aid in reduction.  Feeling satisfied with fracture reduction in both AP and lateral planes the 10 blade was used to incise skin about the anterior knee using the previous surgical scar from total knee arthroplasty.  Dissection was carried sharply through the subcutaneous plane.  Peritenon was split followed by the patellar tendon.  Flexion was imparted through the knee to allow palpation of the notch clearing the polyethylene component.  The guidepin was then placed and advanced in the appropriate trajectory as judged in AP and lateral planes.  Feeling satisfied with guidepin start point and trajectory standard techniques were used to over drill through the box of the femoral component.  The ball-tipped reaming carlos was then passed across the fracture plane and anchored at the level of the lesser.  Standard techniques were used to over ream to a 13.5 mm reamer allowing passage of a 12 mm diameter nail.  Appropriate length was determined.  The nail was inserted using light hammer blows.  The nail positioning was judged in the AP plane both proximally distally.  Feeling satisfied with nail positioning standard techniques were used to place 4 locking screws in the distal segment followed by 2 locking screws up top 1 in the dynamic hole and 1 in a static hole.  All insertion aids were removed.  Final images were sent to the PACS system in AP and lateral planes.  Irrigation was performed followed by layered closure of the wounds.  Dressings were placed.  The patient was allowed to arise from anesthesia and taken to recovery in stable condition.  Overall the patient tolerated the procedure well.    Disposition: Stable to PACU  Walter ALLEN  Shania  Phone Number: [phone]

## 2024-03-08 NOTE — ANESTHESIA PROCEDURE NOTES
Peripheral Block    Patient location during procedure: pre-op  Start time: 3/8/2024 12:40 PM  End time: 3/8/2024 12:45 PM  Reason for block: at surgeon's request and post-op pain management  Staffing  Performed: attending   Authorized by: Bj Gann MD    Performed by: Bj Gann MD  Preanesthetic Checklist  Completed: patient identified, IV checked, site marked, risks and benefits discussed, surgical consent, monitors and equipment checked, pre-op evaluation and timeout performed   Timeout performed at: 3/8/2024 12:40 PM  Peripheral Block  Patient position: laying flat  Prep: ChloraPrep  Patient monitoring: heart rate, cardiac monitor and continuous pulse ox  Block type: femoral  Laterality: right  Injection technique: single-shot  Guidance: ultrasound guided  Local infiltration: lidocaine  Infiltration strength: 1 %  Dose: 1 mL  Needle  Needle type: pencil-tip (pajunk)   Needle gauge: 22 G  Needle length: 10 cm  Needle localization: ultrasound guidance     image stored in chart  Assessment  Injection assessment: negative aspiration for heme, no paresthesia on injection, incremental injection and local visualized surrounding nerve on ultrasound  Paresthesia pain: none  Heart rate change: no  Slow fractionated injection: yes  Additional Notes  Prior to the procedure, the correct patient, procedure and site were confirmed, and the chart reviewed.  Informed consent discussing the risks and benefits of the procedure was obtained.  Risks discussed included, but were not limited to, the possibility of infection, bleeding, and permanent nerve damage.     A Neurologic Exam revealed: grossly normal, though limited secondary to pain and splint     The femoral region and groin were prepped and draped in a sterile fashion.  The procedure was completed using ultrasound guidance with nerve stimulation.  A linear ultrasound probe was placed along the crease of the groin parallel and below the inguinal ligament. The  femoral vein and artery were visualized medial to the femoral nerve. The nerve was noted below the fascia iliaca and above the iliopsoas muscle. The anatomy was noted to be normal.  1% lidocaine was infiltrated at the needle insertion site. Using a 22g 10 cm insulated needle the nerve was approached from lateral to medial in plane with the probe.      After negative aspiration, slow injection of 30 mls local anesthetic solution containing 0.5%  ropivacaine without additives. The tip of the needle was visualized throughout the procedure .  Paresthesias were not noted.  A total of one  pass with the needle was made prior to the start of local anesthetic injection.  The needle was repositioned one time during the procedure. There was not evidence of intraneural injection.        Pain Diagnosis: right thigh       Complexity and/or technical difficulty of this procedure were increased due to: obesity     Complications: none        Comments: patient tolerated the procedure well.

## 2024-03-08 NOTE — PROGRESS NOTES
Occupational Therapy                 Therapy Communication Note    Patient Name: Aida Baker  MRN: [id number]  Today's Date: 3/8/2024     Discipline: Occupational Therapy    Missed Visit Reason: Missed Visit Reason: Patient placed on medical hold (Scheduled for surgery; will need new orders post-op.)    Missed Time: Attempt

## 2024-03-08 NOTE — PROGRESS NOTES
Physical Therapy                 Therapy Communication Note    Patient Name: Aida Baker  MRN: [id number]  Today's Date: 3/8/2024     Discipline: Physical Therapy    Missed Visit Reason: Received order for P.T. eval. Chart reviewed. Pt. was admitted with spiral fracture of R femur following a fall. Pt. is scheduled for surgery today to repair fracture. Will discharge current P.T. order and await post op orders with updated WB statis and activity orders.

## 2024-03-09 PROCEDURE — 1100000001 HC PRIVATE ROOM DAILY

## 2024-03-09 PROCEDURE — 97161 PT EVAL LOW COMPLEX 20 MIN: CPT | Mod: GP | Performed by: PHYSICAL THERAPIST

## 2024-03-09 PROCEDURE — 2500000001 HC RX 250 WO HCPCS SELF ADMINISTERED DRUGS (ALT 637 FOR MEDICARE OP): Performed by: REGISTERED NURSE

## 2024-03-09 PROCEDURE — 97110 THERAPEUTIC EXERCISES: CPT | Mod: GP,CQ

## 2024-03-09 PROCEDURE — 97530 THERAPEUTIC ACTIVITIES: CPT | Mod: GP,CQ

## 2024-03-09 PROCEDURE — 51701 INSERT BLADDER CATHETER: CPT

## 2024-03-09 PROCEDURE — 97530 THERAPEUTIC ACTIVITIES: CPT | Mod: GP | Performed by: PHYSICAL THERAPIST

## 2024-03-09 PROCEDURE — 2500000002 HC RX 250 W HCPCS SELF ADMINISTERED DRUGS (ALT 637 FOR MEDICARE OP, ALT 636 FOR OP/ED): Performed by: HOSPITALIST

## 2024-03-09 PROCEDURE — 2500000004 HC RX 250 GENERAL PHARMACY W/ HCPCS (ALT 636 FOR OP/ED): Performed by: REGISTERED NURSE

## 2024-03-09 PROCEDURE — 97116 GAIT TRAINING THERAPY: CPT | Mod: GP,CQ

## 2024-03-09 PROCEDURE — 2500000004 HC RX 250 GENERAL PHARMACY W/ HCPCS (ALT 636 FOR OP/ED): Performed by: NURSE PRACTITIONER

## 2024-03-09 PROCEDURE — 99232 SBSQ HOSP IP/OBS MODERATE 35: CPT | Performed by: HOSPITALIST

## 2024-03-09 PROCEDURE — 97165 OT EVAL LOW COMPLEX 30 MIN: CPT | Mod: GO

## 2024-03-09 RX ORDER — OXYCODONE HYDROCHLORIDE 5 MG/1
5 TABLET ORAL EVERY 6 HOURS PRN
Qty: 28 TABLET | Refills: 0 | Status: SHIPPED | OUTPATIENT
Start: 2024-03-09 | End: 2024-03-16

## 2024-03-09 RX ORDER — ENOXAPARIN SODIUM 100 MG/ML
40 INJECTION SUBCUTANEOUS EVERY 24 HOURS
Qty: 25 EACH | Refills: 0 | Status: SHIPPED | OUTPATIENT
Start: 2024-03-09 | End: 2024-04-03

## 2024-03-09 RX ORDER — CYCLOBENZAPRINE HCL 5 MG
5 TABLET ORAL 3 TIMES DAILY PRN
Qty: 21 TABLET | Refills: 0 | Status: SHIPPED | OUTPATIENT
Start: 2024-03-09 | End: 2024-03-21 | Stop reason: SDUPTHER

## 2024-03-09 RX ORDER — POTASSIUM CHLORIDE 20 MEQ/1
40 TABLET, EXTENDED RELEASE ORAL ONCE
Status: COMPLETED | OUTPATIENT
Start: 2024-03-09 | End: 2024-03-09

## 2024-03-09 RX ADMIN — CEFAZOLIN SODIUM 2 G: 2 INJECTION, SOLUTION INTRAVENOUS at 06:50

## 2024-03-09 RX ADMIN — LOSARTAN POTASSIUM: 100 TABLET, FILM COATED ORAL at 09:20

## 2024-03-09 RX ADMIN — ENOXAPARIN SODIUM 40 MG: 40 INJECTION SUBCUTANEOUS at 15:56

## 2024-03-09 RX ADMIN — POTASSIUM CHLORIDE 40 MEQ: 1500 TABLET, EXTENDED RELEASE ORAL at 12:46

## 2024-03-09 RX ADMIN — OXYCODONE HYDROCHLORIDE 5 MG: 5 TABLET ORAL at 20:23

## 2024-03-09 RX ADMIN — OXYCODONE HYDROCHLORIDE 5 MG: 5 TABLET ORAL at 09:20

## 2024-03-09 RX ADMIN — ACETAMINOPHEN 650 MG: 325 TABLET ORAL at 20:26

## 2024-03-09 RX ADMIN — AMLODIPINE BESYLATE 5 MG: 5 TABLET ORAL at 20:23

## 2024-03-09 ASSESSMENT — COGNITIVE AND FUNCTIONAL STATUS - GENERAL
DRESSING REGULAR LOWER BODY CLOTHING: TOTAL
TURNING FROM BACK TO SIDE WHILE IN FLAT BAD: A LOT
MOBILITY SCORE: 10
CLIMB 3 TO 5 STEPS WITH RAILING: TOTAL
TOILETING: A LOT
TOILETING: A LOT
DRESSING REGULAR LOWER BODY CLOTHING: TOTAL
STANDING UP FROM CHAIR USING ARMS: A LOT
HELP NEEDED FOR BATHING: A LOT
PERSONAL GROOMING: A LITTLE
PERSONAL GROOMING: A LITTLE
STANDING UP FROM CHAIR USING ARMS: A LOT
HELP NEEDED FOR BATHING: A LOT
DRESSING REGULAR UPPER BODY CLOTHING: A LITTLE
DAILY ACTIVITIY SCORE: 15
CLIMB 3 TO 5 STEPS WITH RAILING: TOTAL
WALKING IN HOSPITAL ROOM: TOTAL
CLIMB 3 TO 5 STEPS WITH RAILING: TOTAL
STANDING UP FROM CHAIR USING ARMS: A LOT
MOVING TO AND FROM BED TO CHAIR: A LOT
WALKING IN HOSPITAL ROOM: TOTAL
TURNING FROM BACK TO SIDE WHILE IN FLAT BAD: A LOT
CLIMB 3 TO 5 STEPS WITH RAILING: TOTAL
DAILY ACTIVITIY SCORE: 15
MOBILITY SCORE: 11
WALKING IN HOSPITAL ROOM: A LOT
MOVING TO AND FROM BED TO CHAIR: A LOT
STANDING UP FROM CHAIR USING ARMS: A LOT
WALKING IN HOSPITAL ROOM: TOTAL
MOBILITY SCORE: 10
DRESSING REGULAR LOWER BODY CLOTHING: TOTAL
MOVING FROM LYING ON BACK TO SITTING ON SIDE OF FLAT BED WITH BEDRAILS: A LOT
TOILETING: A LOT
MOVING FROM LYING ON BACK TO SITTING ON SIDE OF FLAT BED WITH BEDRAILS: A LOT
DRESSING REGULAR UPPER BODY CLOTHING: A LITTLE
PERSONAL GROOMING: A LITTLE
DRESSING REGULAR UPPER BODY CLOTHING: A LITTLE
TURNING FROM BACK TO SIDE WHILE IN FLAT BAD: A LOT
MOVING TO AND FROM BED TO CHAIR: A LOT
MOVING FROM LYING ON BACK TO SITTING ON SIDE OF FLAT BED WITH BEDRAILS: A LOT
HELP NEEDED FOR BATHING: A LOT
MOBILITY SCORE: 10
MOVING TO AND FROM BED TO CHAIR: A LOT
MOVING FROM LYING ON BACK TO SITTING ON SIDE OF FLAT BED WITH BEDRAILS: A LOT
DAILY ACTIVITIY SCORE: 15
TURNING FROM BACK TO SIDE WHILE IN FLAT BAD: A LOT

## 2024-03-09 ASSESSMENT — ACTIVITIES OF DAILY LIVING (ADL): BATHING_ASSISTANCE: MODERATE

## 2024-03-09 ASSESSMENT — PAIN - FUNCTIONAL ASSESSMENT
PAIN_FUNCTIONAL_ASSESSMENT: 0-10

## 2024-03-09 ASSESSMENT — PAIN SCALES - GENERAL
PAINLEVEL_OUTOF10: 0 - NO PAIN
PAINLEVEL_OUTOF10: 2
PAINLEVEL_OUTOF10: 0 - NO PAIN
PAINLEVEL_OUTOF10: 2
PAINLEVEL_OUTOF10: 0 - NO PAIN

## 2024-03-09 ASSESSMENT — PAIN DESCRIPTION - ORIENTATION: ORIENTATION: RIGHT

## 2024-03-09 ASSESSMENT — PAIN DESCRIPTION - DESCRIPTORS: DESCRIPTORS: ACHING

## 2024-03-09 ASSESSMENT — PAIN DESCRIPTION - LOCATION: LOCATION: LEG

## 2024-03-09 NOTE — CARE PLAN
Problem: Fall/Injury  Goal: Use assistive devices by end of the shift  3/9/2024 1721 by Wisam Fraire RN  Outcome: Progressing  3/9/2024 1719 by Wisam Fraire RN  Outcome: Progressing  Goal: Pace activities to prevent fatigue by end of the shift  3/9/2024 1721 by Wisam Fraire RN  Outcome: Progressing  3/9/2024 1719 by Wisam Fraire RN  Outcome: Progressing     Problem: Discharge Planning  Goal: Discharge to home or other facility with appropriate resources  3/9/2024 1721 by Wisam Fraire RN  Outcome: Progressing  3/9/2024 1719 by Wisam Fraire RN  Outcome: Progressing     Problem: Skin  Goal: Participates in plan/prevention/treatment measures  3/9/2024 1721 by Wisam Fraire RN  Outcome: Progressing  3/9/2024 1719 by Wisam Fraire RN  Outcome: Progressing  Goal: Prevent/manage excess moisture  3/9/2024 1721 by Wisam Fraire RN  Outcome: Progressing  3/9/2024 1719 by Wisam Fraire RN  Outcome: Progressing  Goal: Prevent/minimize sheer/friction injuries  3/9/2024 1721 by Wisam Fraire RN  Outcome: Progressing  3/9/2024 1719 by Wisam Fraire RN  Outcome: Progressing  Goal: Promote/optimize nutrition  3/9/2024 1721 by Wisam Fraire RN  Outcome: Progressing  3/9/2024 1719 by Wisam Fraire RN  Outcome: Progressing  Goal: Promote skin healing  3/9/2024 1721 by Wisam Fraire RN  Outcome: Progressing  3/9/2024 1719 by Wisam Fraire RN  Outcome: Progressing     Problem: Pain  Goal: Takes deep breaths with improved pain control throughout the shift  3/9/2024 1721 by Wisam Fraire RN  Outcome: Progressing  3/9/2024 1719 by Wisam Fraire RN  Outcome: Progressing  Goal: Turns in bed with improved pain control throughout the shift  3/9/2024 1721 by Wisam Fraire RN  Outcome: Progressing  3/9/2024 1719 by Wisam Fraire RN  Outcome: Progressing  Goal: Performs ADL's with improved pain control throughout shift  3/9/2024 1721 by Wisam Fraire, RN  Outcome: Progressing  3/9/2024 1719 by Wisam  JESSICA Fraire  Outcome: Progressing  Goal: Participates in PT with improved pain control throughout the shift  3/9/2024 1721 by Wisam Fraire RN  Outcome: Progressing  3/9/2024 1719 by Wisam Fraire RN  Outcome: Progressing  Goal: Free from opioid side effects throughout the shift  3/9/2024 1721 by Wisam Fraire RN  Outcome: Progressing  3/9/2024 1719 by Wisam Fraire RN  Outcome: Progressing  Goal: Free from acute confusion related to pain meds throughout the shift  3/9/2024 1721 by Wisam Fraire RN  Outcome: Progressing  3/9/2024 1719 by Wisam Fraire RN  Outcome: Progressing     Problem: Discharge Planning  Goal: Discharge to home or other facility with appropriate resources  3/9/2024 1721 by Wisam Fraire RN  Outcome: Progressing  3/9/2024 1719 by Wisam Fraire RN  Outcome: Progressing     Problem: Skin  Goal: Participates in plan/prevention/treatment measures  3/9/2024 1721 by Wisam Fraire RN  Outcome: Progressing  3/9/2024 1719 by Wisam Fraire RN  Outcome: Progressing     Problem: Skin  Goal: Prevent/manage excess moisture  3/9/2024 1721 by Wisam Fraire RN  Outcome: Progressing  3/9/2024 1719 by Wisam Fraire RN  Outcome: Progressing     Problem: Skin  Goal: Prevent/minimize sheer/friction injuries  3/9/2024 1721 by Wisam Fraire RN  Outcome: Progressing  3/9/2024 1719 by Wisam Fraire RN  Outcome: Progressing     Problem: Skin  Goal: Promote/optimize nutrition  3/9/2024 1721 by Wisam Fraire RN  Outcome: Progressing  3/9/2024 1719 by Wisam Fraire RN  Outcome: Progressing     Problem: Skin  Goal: Promote skin healing  3/9/2024 1721 by Wisam Fraire RN  Outcome: Progressing  3/9/2024 1719 by Wisam Fraire RN  Outcome: Progressing     Problem: Pain  Goal: Takes deep breaths with improved pain control throughout the shift  3/9/2024 1721 by Wisam Fraire RN  Outcome: Progressing  3/9/2024 1719 by Wisam Fraire, RN  Outcome: Progressing     Problem: Pain  Goal: Turns in bed  with improved pain control throughout the shift  3/9/2024 1721 by Wisam Fraire RN  Outcome: Progressing  3/9/2024 1719 by Wisam Fraire RN  Outcome: Progressing     Problem: Pain  Goal: Performs ADL's with improved pain control throughout shift  3/9/2024 1721 by Wisam Fraire RN  Outcome: Progressing  3/9/2024 1719 by Wisam Fraire RN  Outcome: Progressing     Problem: Pain  Goal: Participates in PT with improved pain control throughout the shift  3/9/2024 1721 by Wisam Fraire RN  Outcome: Progressing  3/9/2024 1719 by Wisam Fraire RN  Outcome: Progressing     Problem: Pain  Goal: Free from opioid side effects throughout the shift  3/9/2024 1721 by Wisam Fraire RN  Outcome: Progressing  3/9/2024 1719 by Wisam Fraire RN  Outcome: Progressing     Problem: Pain  Goal: Free from acute confusion related to pain meds throughout the shift  3/9/2024 1721 by Wisam Fraire RN  Outcome: Progressing  3/9/2024 1719 by Wisam Fraire RN  Outcome: Progressing     Problem: Fall/Injury  Goal: Not fall by end of shift  Outcome: Met  Goal: Be free from injury by end of the shift  Outcome: Met  Goal: Verbalize understanding of personal risk factors for fall in the hospital  Outcome: Met  Goal: Verbalize understanding of risk factor reduction measures to prevent injury from fall in the home  Outcome: Met     Problem: Safety - Adult  Goal: Free from fall injury  Outcome: Met   The patient's goals for the shift include pain control    The clinical goals for the shift include pain control, hemodynamically stable

## 2024-03-09 NOTE — DISCHARGE INSTRUCTIONS
Toe touch weight bearing to right lower extremity   Lovenox 40 mg subcutaneously daily for 28 days for DVT prophylaxis.  Started on 3/8/2024  Follow-up with therapeutic surgeon in 2 weeks

## 2024-03-09 NOTE — PROGRESS NOTES
"Aida Baker is a 78 y.o. female on day 2 of admission presenting with right distal femur fracture after mechanical fall       Subjective : Sitting in chair today no pain currently     Objective     Last Recorded Vitals  /67   Pulse 66   Temp 36.8 °C (98.2 °F)   Resp 16   Ht 1.702 m (5' 7\")   Wt 98.8 kg (217 lb 13 oz)   SpO2 94%   BMI 34.11 kg/m²      Intake/Output last 3 Shifts:    Intake/Output Summary (Last 24 hours) at 3/9/2024 1042  Last data filed at 3/9/2024 0802  Gross per 24 hour   Intake 3550 ml   Output 3150 ml   Net 400 ml         Physical Exam   Gen: NAD  HEENT: EOM, MMM  CV: RRR, no murmurs rubs or gallops  Resp: coarse rhonchi b/l   Abdomen: soft, NT,+BS  LE: No edema      Relevant Results  Lab Results   Component Value Date    WBC 7.2 03/08/2024    HGB 11.5 (L) 03/08/2024    HCT 33.2 (L) 03/08/2024    MCV 96 03/08/2024     03/08/2024     Lab Results   Component Value Date    GLUCOSE 113 (H) 03/08/2024    CALCIUM 8.5 (L) 03/08/2024     (L) 03/08/2024    K 3.3 (L) 03/08/2024    CO2 28 03/08/2024    CL 98 03/08/2024    BUN 16 03/08/2024    CREATININE 0.74 03/08/2024     Assessment/Plan  78 year old female admitted with right distal femur fracture after mechanical fall     -ortho consulted, s/p internal fixation  -PT/OT will need SNF    Acute blood loss anemia: secondary to above, monitor H/H    Hypokalemia: replaced      HTN: resume home meds     DVT ppx: lovenox      Jewel Diaz MD      "

## 2024-03-09 NOTE — PROGRESS NOTES
Physical Therapy    Physical Therapy Evaluation & Treatment    Patient Name: Aida Baker  MRN: [id number]  Today's Date: 3/9/2024   Time Calculation  Start Time: 0930  Stop Time: 0958  Time Calculation (min): 28 min    Assessment/Plan   PT Assessment  PT Assessment Results: Decreased strength, Impaired balance, Decreased mobility, Decreased safety awareness, Orthopedic restrictions  Rehab Prognosis: Good  Barriers to Discharge: Pt. lives alone; TTWB on RLE  Medical Staff Made Aware: Yes  End of Session Communication: Bedside nurse  Assessment Comment: pt  would benefit from further therapy to increase functional mobilty and safety  End of Session Patient Position: Up in chair, Alarm on  IP OR SWING BED PT PLAN  Inpatient or Swing Bed: Inpatient  PT Plan  Treatment/Interventions: Transfer training, Gait training, Therapeutic exercise, Therapeutic activity  PT Plan: Skilled PT  PT Frequency: BID  PT Discharge Recommendations: Moderate intensity level of continued care  Equipment Recommended upon Discharge: Wheeled walker, Wheelchair  PT Recommended Transfer Status: Assist x2, Assistive device  PT - OK to Discharge: Yes (to next level of care, when medically stable)    Current Problem:  Patient Active Problem List   Diagnosis    Nondisplaced spiral fracture of shaft of right femur, initial encounter for closed fracture (CMS/[facility])       Subjective     General Visit Information:  General  Reason for Referral: s/p ORIF of R femur  Referred By: GENNY Sommer CNP  Past Medical History Relevant to Rehab: B/L TKR, parathyroid carcinoma, Thyroid Sx, HTN, remote LLE fx, obesity  Family/Caregiver Present: No  Prior to Session Communication: Bedside nurse  Patient Position Received: Bed, 3 rail up, Alarm on  General Comment: To ED s/p fall due to heel catching her pant leg, R knee twisted and then fell to ground. Pt. c/o R knee pain. Pt. found to have a displaced R femoral shaft fx; s/p ORIF of R femur with Dr. Jauregui 3/8    Home  Living:  Home Living  Home Living Comments: Lives alone in 2 story home with basement. 3-4 OSCAR with HR. Bed/full bath on 1st floor. Owns ww, cane, crutches and raised toilet seat.    Prior Level of Function:  Prior Function Per Pt/Caregiver Report  Prior Function Comments: Indep. with amb. PTA. +drives. Pt. reports 1 other recent fall in her [place].    Precautions:  Precautions  LE Weight Bearing Status:  (Toe Touch WB R LE, KI in placce , no ROM)  Medical Precautions: Fall precautions  Post-Surgical Precautions:  (R knee immobilizer with ambulation; no ROM R knee (per ortho CNP note on 3/9/24))  Braces Applied: R knee immobilizer  Precautions Comment: pt needs vc to maintain TTWB    Vital Signs:     Objective     Pain:  Pain Assessment  Pain Assessment: 0-10  Pain Score: 2  Pain Type: Acute pain  Pain Location: Leg  Pain Orientation: Right    Cognition:  Cognition  Overall Cognitive Status:  (anxious)  Orientation Level: Oriented X4  Insight: Moderate    General Assessments:          Strength  Strength Comments: LLE 4+/5 grossly, RLE (-) SLR, R ankle DF/PF at least 3+/5, BUE WFL           Static Sitting Balance  Static Sitting-Balance Support: Bilateral upper extremity supported  Static Sitting-Level of Assistance: Close supervision  Static Sitting-Comment/Number of Minutes: 7-8 minutes  Static Standing Balance  Static Standing-Balance Support: Bilateral upper extremity supported (on ww)  Static Standing-Level of Assistance: Moderate assistance  Static Standing-Comment/Number of Minutes: 1 min. (assist and cueing to maintain WB status)    Functional Assessments:     Bed Mobility  Bed Mobility:  (supine to sit with modAx1; HOB elevated)  Transfers  Transfer:  (Sit to/from stand with ww and maxAx1 for 1st trial; modAx2, gait belt and ww for 2nd trial; assist and cueing throughout for safe technique and for TTWB)  Ambulation/Gait Training  Ambulation/Gait Training Performed:  (Bed to chair towards L side ~ 3 ft  with ww, gait belt and mod/maxAx2; max cueing for technique/sequence; difficulty maintaining WB status with taking steps, therefore, pt. mostly pivoting on L foot)          Extremity/Trunk Assessments:  RUE   RUE : Within Functional Limits  LUE   LUE: Within Functional Limits  RLE   RLE :  (R knee ROM NT due to precautions; R hip and ankle ROM WNL)  LLE   LLE : Within Functional Limits    Treatments:     Pt. educated extensively on transfer technique, precautions and safety concerns with d/c home at this time. Multiple transfer attempts performed- see above     Outcome Measures:  [facility] Basic Mobility  Turning from your back to your side while in a flat bed without using bedrails: A lot  Moving from lying on your back to sitting on the side of a flat bed without using bedrails: A lot  Moving to and from bed to chair (including a wheelchair): A lot  Standing up from a chair using your arms (e.g. wheelchair or bedside chair): A lot  To walk in hospital room: Total  Climbing 3-5 steps with railing: Total  Basic Mobility - Total Score: 10                            Goals:  Encounter Problems       Encounter Problems (Active)       Impaired mobility        Perform all bed mobility with minAx1       Start:  03/09/24    Expected End:  03/23/24            Perform all transfers with ww and minAx1 (Progressing)       Start:  03/09/24    Expected End:  03/23/24            Patient will ambulate >/=10 ft. with ww and minAx1 maintaining RLE wt bearing status       Start:  03/09/24    Expected End:  03/23/24            Patient will perform LE HEP with supervision        Start:  03/09/24    Expected End:  03/23/24                 Education Documentation  Mobility Training, taught by Sylvia Jean PT at 3/9/2024  1:19 PM.  Learner: Patient  Readiness: Acceptance  Method: Explanation, Demonstration  Response: Verbalizes Understanding, Needs Reinforcement  Comment: see note    Education Comments  No comments found.

## 2024-03-09 NOTE — PROGRESS NOTES
Femur Surgery    Progress Note    Subjective:     Post-Operative Day: 1 Status Post right reduction internal fixation for displaced right femoral shaft fracture  Systemic or Specific Complaints:No Complaints    Objective:     Visit Vitals  /67   Pulse 66   Temp 36.8 °C (98.2 °F)   Resp 16       General: alert and oriented, in no acute distress, appears stated age, and cooperative   Wound: no erythema, no edema, no drainage, and dressing clean dry and intact   Motion: Painful range of Motion   DVT Exam: No evidence of DVT seen on physical exam.  Negative Jean-Paul's sign.  No significant calf/ankle edema.       NVI in lower extremity. Thigh swollen but soft. Moving foot and ankle.      Data Review  No results found for this or any previous visit (from the past 24 hour(s)).    Assessment:     Status Post right ORIF right displaced femoral shaft fracture. Doing well postoperatively.     Acute postoperative pain: Reports mild to moderate pain to operative extremity.  Exacerbated by movement, relieved by rest ice and pain medication.  Continue current pain management.    Acute postoperative blood loss anemia secondary to expected surgical blood loss: Hemoglobin this a.m. [place] 0.5.  Patient asymptomatic, remains hemodynamically stable with no signs of active bleeding.    Hypokalemia.  Patient's potassium level this morning 3.3.  Will supplement with 40 mill equivalents of potassium.      Plan:      1: Continues current post-op course :  2:  Continue Deep venous thrombosis prophylaxis: Lovenox 40 mg subcutaneously daily for 28 days  3:  Continue physical therapy: TTWB to right lower extremity. Knee immobilizer with ambulation. No ROM to right knee.   4:  Continue Pain Control  5.   Discharge planning: Patient discharge disposition to be determined.  Social work and TCC following for discharge planning.    Rahat Sommer, APRN-CNP

## 2024-03-09 NOTE — PROGRESS NOTES
03/09/24 1619   Discharge Planning   Living Arrangements Alone   Support Systems Family members   Type of Residence Private residence   Who is requesting discharge planning? Provider   Home or Post Acute Services Post acute facilities (Rehab/SNF/etc)   Type of Post Acute Facility Services Skilled nursing   Patient expects to be discharged to: [facility]   Does the patient need discharge transport arranged? Yes   RoundTrip coordination needed? Yes   Patient Choice   Provider Choice list and CMS website (https://medicare.gov/care-compare#search) for post-acute Quality and Resource Measure Data were provided and reviewed with: Patient     Met with pt this am after seen by [facility] PT 10 OT 15. S/P nailing right femoral fx. Agree to SNF , offered freedom of choice . Chose SNF [facility]. Referral sent.

## 2024-03-09 NOTE — PROGRESS NOTES
Occupational Therapy    Evaluation    Patient Name: Aida Baker  MRN: [id number]  Today's Date: 3/9/2024  Time Calculation  Start Time: 1035  Stop Time: 1052  Time Calculation (min): 17 min    Assessment  IP OT Assessment  End of Session Communication: Bedside nurse  End of Session Patient Position:  (Seated in chair, PTA still working with pt.)  Plan:  Treatment Interventions: ADL retraining, Functional transfer training, Endurance training, Patient/family training, Equipment evaluation/education, Compensatory technique education, Cognitive reorientation  OT Frequency: 3 times per week  OT Discharge Recommendations: High intensity level of continued care  OT - OK to Discharge: Yes (when medically appropriate)    Subjective   Current Problem:  1. Nondisplaced spiral fracture of shaft of right femur, initial encounter for closed fracture (CMS/HCC)  Case Request Operating Room: Open Reduction Internal Fixation Femur    Case Request Operating Room: Open Reduction Internal Fixation Femur    cyclobenzaprine (Flexeril) 5 mg tablet    enoxaparin (Lovenox) 40 mg/0.4 mL syringe    oxyCODONE (Roxicodone) 5 mg immediate release tablet        General:  General  Reason for Referral: s/p ORIF of R femur  Referred By: GENNY Sommer CNP  Past Medical History Relevant to Rehab: B/L TKR, parathyroid carcinoma, Thyroid Sx, HTN, remote LLE fx, obesity  Family/Caregiver Present: No  Co-Treatment:  (PTA)  Prior to Session Communication: Bedside nurse  Patient Position Received: Bed, 3 rail up, Alarm on  General Comment: To ED s/p fall due to heel catching her pant leg, R knee twisted and then fell to ground. Pt. c/o R knee pain. Pt. found to have a displaced R femoral shaft fx; s/p ORIF of R femur with Dr. Jauregui 3/8  Precautions:  LE Weight Bearing Status: Right Toe-Touch Weight Bearing  Medical Precautions: Fall precautions  Post-Surgical Precautions:  (R knee immobilizer with ambulation; no ROM R knee (per ortho CNP note on  3/9/24))  Braces Applied: R knee immobilizer  Vital Signs:     Pain:  Pain Assessment  Pain Assessment: 0-10  Pain Score: 2  Pain Type: Acute pain, Surgical pain  Pain Location: Leg  Pain Orientation: Right    Objective   Cognition:  Overall Cognitive Status: Within Functional Limits  Orientation Level: Oriented X4  Insight: Moderate           Home Living:  Home Living Comments: Lives alone in 2 story home with basement. 3-4 OSCAR with HR. Bed/full bath on 1st floor. Owns ww, cane, crutches and raised toilet seat. Walk in shower with seat and safety bars.  Owns FWW and cane.  No AD use.  Independent with ADL/IADL.  1st floor laundry.  Drives.  2 recent falls.   Prior Function:     IADL History:     ADL:  Eating Assistance: Independent  Grooming Deficit: Setup  Bathing Assistance: Moderate  UE Dressing Deficit: Setup  LE Dressing Assistance: Total  Toileting Assistance with Device: Maximal    Transfers:    Transfer 1  Trials/Comments 1: Sit to stand at chair, chair to BSC stand pivot, sit<>stand at BSC, BSC to chair stand pivot, Stand to sit at chair, 2 mod to 2 max assist, UEs shaking with effort.  FWW for all.  Assist to maintain TTWB RLE, assist for balance, boost, descent. Max safety cues.  Pt. is anxious, often not attending to safety cues and needs additional more direct cueing.        Sitting Balance:  Static Sitting Balance  Static Sitting-Comment/Number of Minutes: Good  Dynamic Sitting Balance  Dynamic Sitting-Comments: Fair  Standing Balance:  Static Standing Balance  Static Standing-Comment/Number of Minutes: Poor  Dynamic Standing Balance  Dynamic Standing-Comments: Poor         Extremities: RUE   RUE :  (AROM WFL.  4/5 MMT.) and LUE   LUE:  (AROM WFL.  4/5 MMT.)      Outcome Measures: [facility] Daily Activity  Putting on and taking off regular lower body clothing: Total  Bathing (including washing, rinsing, drying): A lot  Putting on and taking off regular upper body clothing: A little  Toileting, which  includes using toilet, bedpan or urinal: A lot  Taking care of personal grooming such as brushing teeth: A little  Eating Meals: None  Daily Activity - Total Score: 15      Education Documentation  Precautions, taught by Sarah Shah OT at 3/9/2024  4:02 PM.  Learner: Patient  Readiness: Acceptance  Method: Explanation  Response: Verbalizes Understanding, Needs Reinforcement    ADL Training, taught by Sarah Shah OT at 3/9/2024  4:02 PM.  Learner: Patient  Readiness: Acceptance  Method: Explanation  Response: Verbalizes Understanding, Needs Reinforcement    Education Comments  No comments found.      Goals:   Encounter Problems       Encounter Problems (Active)       OT Goals       Fair (-) dynamic standing balance for ADL with UE support, while maintaining RLE TTWB.        Start:  03/09/24    Expected End:  03/23/24            Mod assist sit/stand, bed/chair/commode with FWW and RLE TTWB.        Start:  03/09/24    Expected End:  03/23/24            Min assist toileting.        Start:  03/09/24    Expected End:  03/23/24            Mod assist LB dressing.        Start:  03/09/24    Expected End:  03/23/24

## 2024-03-10 LAB
ANION GAP SERPL CALC-SCNC: 12 MMOL/L (ref 10–20)
ATRIAL RATE: 88 BPM
BUN SERPL-MCNC: 15 MG/DL (ref 6–23)
CALCIUM SERPL-MCNC: 8.6 MG/DL (ref 8.6–10.3)
CHLORIDE SERPL-SCNC: 104 MMOL/L (ref 98–107)
CO2 SERPL-SCNC: 24 MMOL/L (ref 21–32)
CREAT SERPL-MCNC: 0.64 MG/DL (ref 0.5–1.05)
EGFRCR SERPLBLD CKD-EPI 2021: 90 ML/MIN/1.73M*2
ERYTHROCYTE [DISTWIDTH] IN BLOOD BY AUTOMATED COUNT: 11.4 % (ref 11.5–14.5)
GLUCOSE SERPL-MCNC: 107 MG/DL (ref 74–99)
HCT VFR BLD AUTO: 30.3 % (ref 36–46)
HGB BLD-MCNC: 10.2 G/DL (ref 12–16)
MAGNESIUM SERPL-MCNC: 1.85 MG/DL (ref 1.6–2.4)
MCH RBC QN AUTO: 32.8 PG (ref 26–34)
MCHC RBC AUTO-ENTMCNC: 33.7 G/DL (ref 32–36)
MCV RBC AUTO: 97 FL (ref 80–100)
NRBC BLD-RTO: 0 /100 WBCS (ref 0–0)
P AXIS: 74 DEGREES
P OFFSET: 182 MS
P ONSET: 126 MS
PLATELET # BLD AUTO: 211 X10*3/UL (ref 150–450)
POTASSIUM SERPL-SCNC: 3.7 MMOL/L (ref 3.5–5.3)
PR INTERVAL: 194 MS
Q ONSET: 223 MS
QRS COUNT: 14 BEATS
QRS DURATION: 152 MS
QT INTERVAL: 406 MS
QTC CALCULATION(BAZETT): 491 MS
QTC FREDERICIA: 461 MS
R AXIS: 55 DEGREES
RBC # BLD AUTO: 3.11 X10*6/UL (ref 4–5.2)
SODIUM SERPL-SCNC: 136 MMOL/L (ref 136–145)
T AXIS: 42 DEGREES
T OFFSET: 426 MS
VENTRICULAR RATE: 88 BPM
WBC # BLD AUTO: 7.7 X10*3/UL (ref 4.4–11.3)

## 2024-03-10 PROCEDURE — 83735 ASSAY OF MAGNESIUM: CPT | Performed by: HOSPITALIST

## 2024-03-10 PROCEDURE — 80048 BASIC METABOLIC PNL TOTAL CA: CPT | Performed by: HOSPITALIST

## 2024-03-10 PROCEDURE — 2500000004 HC RX 250 GENERAL PHARMACY W/ HCPCS (ALT 636 FOR OP/ED): Performed by: REGISTERED NURSE

## 2024-03-10 PROCEDURE — 2500000001 HC RX 250 WO HCPCS SELF ADMINISTERED DRUGS (ALT 637 FOR MEDICARE OP): Performed by: REGISTERED NURSE

## 2024-03-10 PROCEDURE — 36415 COLL VENOUS BLD VENIPUNCTURE: CPT | Performed by: HOSPITALIST

## 2024-03-10 PROCEDURE — 97110 THERAPEUTIC EXERCISES: CPT | Mod: GP,CQ

## 2024-03-10 PROCEDURE — 97530 THERAPEUTIC ACTIVITIES: CPT | Mod: GP,CQ

## 2024-03-10 PROCEDURE — 1100000001 HC PRIVATE ROOM DAILY

## 2024-03-10 PROCEDURE — 85027 COMPLETE CBC AUTOMATED: CPT | Performed by: HOSPITALIST

## 2024-03-10 PROCEDURE — 99232 SBSQ HOSP IP/OBS MODERATE 35: CPT | Performed by: HOSPITALIST

## 2024-03-10 RX ADMIN — ENOXAPARIN SODIUM 40 MG: 40 INJECTION SUBCUTANEOUS at 16:58

## 2024-03-10 RX ADMIN — OXYCODONE HYDROCHLORIDE 5 MG: 5 TABLET ORAL at 09:58

## 2024-03-10 RX ADMIN — AMLODIPINE BESYLATE 5 MG: 5 TABLET ORAL at 20:39

## 2024-03-10 RX ADMIN — OXYCODONE HYDROCHLORIDE 5 MG: 5 TABLET ORAL at 20:39

## 2024-03-10 RX ADMIN — CYCLOBENZAPRINE HYDROCHLORIDE 5 MG: 10 TABLET, FILM COATED ORAL at 16:57

## 2024-03-10 RX ADMIN — LOSARTAN POTASSIUM: 100 TABLET, FILM COATED ORAL at 09:58

## 2024-03-10 RX ADMIN — OXYCODONE HYDROCHLORIDE 5 MG: 5 TABLET ORAL at 14:15

## 2024-03-10 RX ADMIN — OXYCODONE HYDROCHLORIDE 5 MG: 5 TABLET ORAL at 04:57

## 2024-03-10 RX ADMIN — ACETAMINOPHEN 650 MG: 325 TABLET ORAL at 00:32

## 2024-03-10 RX ADMIN — OXYCODONE HYDROCHLORIDE 5 MG: 5 TABLET ORAL at 00:32

## 2024-03-10 ASSESSMENT — COGNITIVE AND FUNCTIONAL STATUS - GENERAL
TOILETING: A LOT
WALKING IN HOSPITAL ROOM: TOTAL
MOBILITY SCORE: 10
MOVING TO AND FROM BED TO CHAIR: A LOT
MOVING FROM LYING ON BACK TO SITTING ON SIDE OF FLAT BED WITH BEDRAILS: A LOT
MOVING TO AND FROM BED TO CHAIR: A LOT
CLIMB 3 TO 5 STEPS WITH RAILING: TOTAL
WALKING IN HOSPITAL ROOM: TOTAL
CLIMB 3 TO 5 STEPS WITH RAILING: TOTAL
HELP NEEDED FOR BATHING: A LOT
WALKING IN HOSPITAL ROOM: TOTAL
DRESSING REGULAR LOWER BODY CLOTHING: TOTAL
STANDING UP FROM CHAIR USING ARMS: A LOT
STANDING UP FROM CHAIR USING ARMS: A LOT
TURNING FROM BACK TO SIDE WHILE IN FLAT BAD: A LOT
TURNING FROM BACK TO SIDE WHILE IN FLAT BAD: A LOT
MOVING FROM LYING ON BACK TO SITTING ON SIDE OF FLAT BED WITH BEDRAILS: A LOT
PERSONAL GROOMING: A LITTLE
CLIMB 3 TO 5 STEPS WITH RAILING: TOTAL
MOBILITY SCORE: 10
MOBILITY SCORE: 10
TURNING FROM BACK TO SIDE WHILE IN FLAT BAD: A LOT
STANDING UP FROM CHAIR USING ARMS: A LOT
MOVING TO AND FROM BED TO CHAIR: A LOT
DRESSING REGULAR UPPER BODY CLOTHING: A LITTLE
MOVING FROM LYING ON BACK TO SITTING ON SIDE OF FLAT BED WITH BEDRAILS: A LOT
DAILY ACTIVITIY SCORE: 15

## 2024-03-10 ASSESSMENT — PAIN SCALES - GENERAL
PAINLEVEL_OUTOF10: 9
PAINLEVEL_OUTOF10: 5 - MODERATE PAIN
PAINLEVEL_OUTOF10: 7
PAINLEVEL_OUTOF10: 10 - WORST POSSIBLE PAIN
PAINLEVEL_OUTOF10: 5 - MODERATE PAIN
PAINLEVEL_OUTOF10: 0 - NO PAIN
PAINLEVEL_OUTOF10: 7
PAINLEVEL_OUTOF10: 10 - WORST POSSIBLE PAIN
PAINLEVEL_OUTOF10: 5 - MODERATE PAIN

## 2024-03-10 ASSESSMENT — PAIN - FUNCTIONAL ASSESSMENT
PAIN_FUNCTIONAL_ASSESSMENT: 0-10

## 2024-03-10 ASSESSMENT — PAIN DESCRIPTION - DESCRIPTORS: DESCRIPTORS: ACHING

## 2024-03-10 ASSESSMENT — PAIN DESCRIPTION - LOCATION
LOCATION: LEG
LOCATION: LEG

## 2024-03-10 NOTE — CARE PLAN
Problem: Fall/Injury  Goal: Use assistive devices by end of the shift  Outcome: Progressing  Goal: Pace activities to prevent fatigue by end of the shift  Outcome: Progressing     Problem: Discharge Planning  Goal: Discharge to home or other facility with appropriate resources  Outcome: Progressing     Problem: Skin  Goal: Participates in plan/prevention/treatment measures  Outcome: Progressing  Goal: Prevent/manage excess moisture  Outcome: Progressing  Goal: Prevent/minimize sheer/friction injuries  Outcome: Progressing  Goal: Promote/optimize nutrition  Outcome: Progressing  Goal: Promote skin healing  Outcome: Progressing     Problem: Pain  Goal: Takes deep breaths with improved pain control throughout the shift  Outcome: Progressing  Goal: Turns in bed with improved pain control throughout the shift  Outcome: Progressing  Goal: Walks with improved pain control throughout the shift  Outcome: Progressing  Goal: Performs ADL's with improved pain control throughout shift  Outcome: Progressing  Goal: Participates in PT with improved pain control throughout the shift  Outcome: Progressing  Goal: Free from opioid side effects throughout the shift  Outcome: Progressing  Goal: Free from acute confusion related to pain meds throughout the shift  Outcome: Progressing   The patient's goals for the shift include pain control    The clinical goals for the shift include Pt will be free of falls

## 2024-03-10 NOTE — PROGRESS NOTES
Physical Therapy    Physical Therapy Treatment    Patient Name: Aida Baker  MRN: [id number]  Today's Date: 3/9/2024  Time Calculation  Start Time: 1036  Stop Time: 1118  Time Calculation (min): 42 min       Assessment/Plan   PT Assessment  PT Assessment Results: Decreased strength, Impaired balance, Decreased mobility, Decreased safety awareness, Orthopedic restrictions  Rehab Prognosis: Good  Medical Staff Made Aware: Yes  End of Session Communication: Bedside nurse  Assessment Comment: pt  would benefit from further therapy to increase functional mobilty and safety  End of Session Patient Position: Up in chair, Alarm on    PT Plan  Inpatient/Swing Bed or Outpatient: Inpatient  Treatment/Interventions: Transfer training, Gait training, Therapeutic exercise, Therapeutic activity  PT Plan: Skilled PT  PT Frequency: BID  PT Discharge Recommendations: Moderate intensity level of continued care  Equipment Recommended upon Discharge: Wheeled walker, Wheelchair PT Recommended Transfer Status: Assist x2, Assistive device    General Visit Information:   PT  Visit  PT Received On: 03/09/24  General  Reason for Referral: R femur fx  Prior to Session Communication: Bedside nurse (cleared y nursing to participate)  General Comment: pt is agreeable to participate, states she is disappointd that she needs to go to a SNF but understands that she is not safe to go home yet        Subjective     Precautions:  Precautions  LE Weight Bearing Status:  (Toe Touch WB R LE, KI in placce , no ROM)  Medical Precautions: Fall precautions  Post-Surgical Precautions:  (R knee immobilizer with ambulation; no ROM R knee (per ortho CNP note on 3/9/24))  Braces Applied: R knee immobilizer  Precautions Comment: pt needs vc to maintain TTWB    Vital Signs:     Objective     Pain:  Pain Assessment  Pain Assessment: 0-10  Pain Score: 2  Pain Type: Acute pain, Surgical pain  Pain Location: Leg  Pain Orientation: Right  Pain Descriptors: Aching (no  "pain at rest with intermittent \"sharp zings\")    Cognition:  Cognition  Overall Cognitive Status: Within Functional Limits  Orientation Level: Oriented X4       Treatments:  Therapeutic Exercise  Therapeutic Exercise Performed: Yes  Therapeutic Exercise Activity 1: Pt performed supine ther ex including ankle pumps, quad sets, glut sets , ABD/ADD with LE supported  , KI in place throughout  LAQ and Hip flexion with L LE only     Bed Mobility  Bed Mobility: No (seated upon arrival)    Ambulation/Gait Training  Ambulation/Gait Training Performed: Yes (pt able to pivot  chair to [facility] with Max A x 2 . also able  take short sliding vs hoppiong side steps from BSC to chair with Max A x 2 and vc for sequncing as well as to maintain TTDWB , pt tolerated static stance for up to 60 seconds with Max A and WW)    Transfers  Transfer: Yes  Transfer 1  Technique 1: Sit to stand, Stand to sit  Trials/Comments 1: transfers sit<--> stand with WW and Mod A x 2 , performed from chair with arms and bedside commode , gait belt in use for safety gait belt used for safety (able to transfers sit <--> stand with Max A x 1 ater additional trials, gait belt in use, vc to focus on WB through UE's and to maintain TTDWB through R LE)    Stairs  Stairs: No       Outcome Measures:  [facility] Basic Mobility  Turning from your back to your side while in a flat bed without using bedrails: A lot  Moving from lying on your back to sitting on the side of a flat bed without using bedrails: A lot  Moving to and from bed to chair (including a wheelchair): A lot  Standing up from a chair using your arms (e.g. wheelchair or bedside chair): A lot  To walk in hospital room: A lot  Climbing 3-5 steps with railing: Total  Basic Mobility - Total Score: 11  Education Documentation  No documentation found.  Education Comments  No comments found.        EDUCATION:  Individual(s) Educated: Patient  Education Provided: Body Mechanics, Fall Risk, Home Exercise Program, " Post-Op Precautions  Patient Response to Education: Patient/Caregiver Verbalized Understanding of Information    Encounter Problems       Encounter Problems (Active)       Impaired mobility        Perform all bed mobility with minAx1 (Progressing)       Start:  03/09/24    Expected End:  03/23/24            Perform all transfers with ww and minAx1 (Progressing)       Start:  03/09/24    Expected End:  03/23/24            Patient will ambulate >/=10 ft. with ww and minAx1 maintaining RLE wt bearing status (Progressing)       Start:  03/09/24    Expected End:  03/23/24            Patient will perform LE HEP with supervision  (Progressing)       Start:  03/09/24    Expected End:  03/23/24

## 2024-03-10 NOTE — PROGRESS NOTES
Physical Therapy    Physical Therapy Treatment    Patient Name: Aida Baker  MRN: [id number]  Today's Date: 3/10/2024  Time Calculation  Start Time: 0835  Stop Time: 0900  Time Calculation (min): 25 min       Assessment/Plan   PT Assessment  PT Assessment Results: Decreased strength, Impaired balance, Decreased mobility, Decreased safety awareness, Orthopedic restrictions  Rehab Prognosis: Good  End of Session Communication: Bedside nurse  Assessment Comment: Pt continues to make progress but will benefit from continued skilled PT services to progress functional mobility.  End of Session Patient Position: Up in chair, Alarm on  PT Plan  Inpatient/Swing Bed or Outpatient: Inpatient  PT Plan  Treatment/Interventions: Bed mobility, Transfer training, Gait training, Strengthening  PT Plan: Skilled PT  PT Frequency: BID  PT Discharge Recommendations: Moderate intensity level of continued care  Equipment Recommended upon Discharge: Wheeled walker, Wheelchair  PT Recommended Transfer Status: Assist x2, Assistive device  General Visit Information:   PT  Visit  PT Received On: 03/10/24  General  Reason for Referral: R femur fx  Referred By: GENNY Sommer CNP  Past Medical History Relevant to Rehab: B/L TKR, parathyroid carcinoma, Thyroid Sx, HTN, remote LLE fx, obesity  Prior to Session Communication: Bedside nurse  Patient Position Received: Bed, 3 rail up, Alarm on  General Comment: Pt states having increased pain but needing to use the BSC, agreeable to PT services.    Subjective   Precautions:  Precautions  LE Weight Bearing Status: Right Toe-Touch Weight Bearing  Medical Precautions: Fall precautions  Braces Applied: R knee immobilizer  Vital Signs:       Objective   Pain:  Pain Assessment  Pain Assessment: 0-10  Pain Score: 9  Pain Type: Acute pain, Surgical pain  Pain Location: Leg  Pain Orientation: Right  Pain Descriptors: Aching  Pain Interventions: Medication (See MAR), Cold applied  Cognition:  Cognition  Orientation  Level: Oriented X4  Postural Control:     Extremity/Trunk Assessments:        Activity Tolerance:  Activity Tolerance  Endurance: Decreased tolerance for upright activites  Treatments:  Therapeutic Exercise  Therapeutic Exercise Performed: Yes (Seated LLE AP and LAQ. Supine AP, quad set, glute set, HS on LLE only. All x 10 ea BLE. KI on at all times to RLE)    Bed Mobility  Bed Mobility: Yes (Sup>sit with mod A for movement of RLE to EOB. Pt using hand rails to assist.)    Ambulation/Gait Training  Ambulation/Gait Training Performed: Yes (Pt mostly using stand pivot from bed to BSC and able to take small sliding step with LLE from BSC to chair. Pt using a WW and given mod A with gait belt. Pt appears to maintain TTWB with inc BUE support on WW. Pt very anxious throughout.)  Transfers  Transfer: Yes (Sit<>stand with mod A and cues for hand placement leaving one hand on the WW.)    Outcome Measures:  [facility] Basic Mobility  Turning from your back to your side while in a flat bed without using bedrails: A lot  Moving from lying on your back to sitting on the side of a flat bed without using bedrails: A lot  Moving to and from bed to chair (including a wheelchair): A lot  Standing up from a chair using your arms (e.g. wheelchair or bedside chair): A lot  To walk in hospital room: Total  Climbing 3-5 steps with railing: Total  Basic Mobility - Total Score: 10    Education Documentation  Mobility Training, taught by Lety Carroll PTA at 3/10/2024 12:54 PM.  Learner: Patient  Readiness: Acceptance  Method: Explanation  Response: Verbalizes Understanding    Education Comments  No comments found.        EDUCATION:  Outpatient Education  Individual(s) Educated: Patient  Education Provided: Body Mechanics, Fall Risk, Home Exercise Program, Post-Op Precautions  Education Comment: Pt educated in gait, transfers, ther ex and precautions.    Encounter Problems       Encounter Problems (Active)       Impaired mobility         Perform all bed mobility with minAx1 (Progressing)       Start:  03/09/24    Expected End:  03/23/24            Perform all transfers with ww and minAx1 (Progressing)       Start:  03/09/24    Expected End:  03/23/24            Patient will ambulate >/=10 ft. with ww and minAx1 maintaining RLE wt bearing status (Progressing)       Start:  03/09/24    Expected End:  03/23/24            Patient will perform LE HEP with supervision  (Progressing)       Start:  03/09/24    Expected End:  03/23/24

## 2024-03-10 NOTE — PROGRESS NOTES
DAILY PROGRESS NOTE      POD: 2    Patient doingwell    Visit Vitals  /77   Pulse 70   Temp 36.4 °C (97.5 °F)   Resp 16        Pain Control   mild    No chest pain or shortness of breath.  No calf pain    Exam:   Incision(s): CDI  Dressing CDI  Operative extremity shows neuro vascular status intact. Flexion and extension intact on operative extremity  Calves soft and non-tender without evidence of DVT.    I&O  I/O last 3 completed shifts:  In: 235 [P.O.:230; I.V.:5]  Out: 1620 [Urine:1620]      Assessment:  Good Post Operative Course.    Plan: Patient doing well.  Plan to go to rehab facility tomorrow.    Seth Bridges MD MD  8/31/2023 6:27 PM

## 2024-03-10 NOTE — PROGRESS NOTES
"Aida Baker is a 78 y.o. female on day 3 of admission presenting with right distal femur fracture after mechanical fall       Subjective : Sitting in chair today no pain currently     Objective     Last Recorded Vitals  /77   Pulse 70   Temp 36.4 °C (97.5 °F)   Resp 16   Ht 1.702 m (5' 7\")   Wt 98.8 kg (217 lb 13 oz)   SpO2 95%   BMI 34.11 kg/m²      Intake/Output last 3 Shifts:    Intake/Output Summary (Last 24 hours) at 3/10/2024 1254  Last data filed at 3/10/2024 0900  Gross per 24 hour   Intake 240 ml   Output 1100 ml   Net -860 ml         Physical Exam   Gen: NAD  HEENT: EOM, MMM  CV: RRR, no murmurs rubs or gallops  Resp: coarse rhonchi b/l   Abdomen: soft, NT,+BS  LE: No edema      Relevant Results  Lab Results   Component Value Date    WBC 7.7 03/10/2024    HGB 10.2 (L) 03/10/2024    HCT 30.3 (L) 03/10/2024    MCV 97 03/10/2024     03/10/2024     Lab Results   Component Value Date    GLUCOSE 107 (H) 03/10/2024    CALCIUM 8.6 03/10/2024     03/10/2024    K 3.7 03/10/2024    CO2 24 03/10/2024     03/10/2024    BUN 15 03/10/2024    CREATININE 0.64 03/10/2024     Assessment/Plan  78 year old female admitted with right distal femur fracture after mechanical fall     -ortho consulted, s/p internal fixation  -PT/OT will need SNF    Acute blood loss anemia: secondary to above, monitor H/H    Hypokalemia: replaced      HTN: resume home meds     DVT ppx: lovenox      Jewel Diaz MD      "

## 2024-03-10 NOTE — PROGRESS NOTES
Physical Therapy    Physical Therapy Treatment    Patient Name: Aida Baker  MRN: [id number]  Today's Date: 3/10/2024  Time Calculation  Start Time: 1317  Stop Time: 1340  Time Calculation (min): 23 min       Assessment/Plan   PT Assessment  PT Assessment Results: Decreased strength, Impaired balance, Decreased mobility, Decreased safety awareness, Orthopedic restrictions  Rehab Prognosis: Good  End of Session Communication: Bedside nurse  Assessment Comment: Pt continues to make progress but will benefit from continued skilled PT services to progress functional mobility.  End of Session Patient Position: Bed, 3 rail up, Alarm on  PT Plan  Inpatient/Swing Bed or Outpatient: Inpatient  PT Plan  Treatment/Interventions: Bed mobility, Transfer training, Gait training, Strengthening  PT Plan: Skilled PT  PT Frequency: BID  PT Discharge Recommendations: Moderate intensity level of continued care  Equipment Recommended upon Discharge: Wheeled walker, Wheelchair  PT Recommended Transfer Status: Assist x2, Assistive device    General Visit Information:   PT  Visit  PT Received On: 03/10/24  General  Reason for Referral: R femur fx  Referred By: GENNY Sommer CNP  Past Medical History Relevant to Rehab: B/L TKR, parathyroid carcinoma, Thyroid Sx, HTN, remote LLE fx, obesity  Prior to Session Communication: Bedside nurse  Patient Position Received: Up in chair, Alarm on  General Comment: Pt states having increased pain but needing to use the BSC, agreeable to PT services.    Subjective   Precautions:  Precautions  LE Weight Bearing Status: Right Toe-Touch Weight Bearing  Medical Precautions: Fall precautions  Braces Applied: R knee immobilizer  Vital Signs:       Objective   Pain:  Pain Assessment  Pain Assessment: 0-10  Pain Score: 7  Pain Type: Acute pain, Surgical pain  Pain Location: Leg  Pain Orientation: Right  Pain Descriptors: Aching  Pain Interventions: Medication (See MAR), Cold  applied  Cognition:  Cognition  Orientation Level: Oriented X4  Postural Control:     Extremity/Trunk Assessments:        Activity Tolerance:  Activity Tolerance  Endurance: Decreased tolerance for upright activites  Treatments:  Therapeutic Exercise  Therapeutic Exercise Performed: Yes (Supine AP, quad set, glute set, abd x 10 ea BLE)    Bed Mobility  Bed Mobility: Yes (Sit>sup with use of hand rail and PTA supporting RLE to get leg into bed. KI on throughout.)    Ambulation/Gait Training  Ambulation/Gait Training Performed: Yes (Pt continues to be limited in gait distance from chair>BSC>bed with WW, gait belt and mod A. Increased cues this session to maintain TTWB on RLE. KI on throughout.)  Transfers  Transfer:  (Multiple sit<>stand transfers from chair, BSC and EOB with mod A with WW and gait belt.)    Outcome Measures:  [facility] Basic Mobility  Turning from your back to your side while in a flat bed without using bedrails: A lot  Moving from lying on your back to sitting on the side of a flat bed without using bedrails: A lot  Moving to and from bed to chair (including a wheelchair): A lot  Standing up from a chair using your arms (e.g. wheelchair or bedside chair): A lot  To walk in hospital room: Total  Climbing 3-5 steps with railing: Total  Basic Mobility - Total Score: 10    Education Documentation  Mobility Training, taught by Lety Carroll PTA at 3/10/2024  2:50 PM.  Learner: Patient  Readiness: Acceptance  Method: Explanation  Response: Verbalizes Understanding, Needs Reinforcement    Mobility Training, taught by Lety Carroll PTA at 3/10/2024 12:54 PM.  Learner: Patient  Readiness: Acceptance  Method: Explanation  Response: Verbalizes Understanding    Education Comments  No comments found.        EDUCATION:  Outpatient Education  Individual(s) Educated: Patient  Education Provided: Body Mechanics, Fall Risk, Home Exercise Program, Post-Op Precautions  Education Comment: Pt educated in gait,  transfers, ther ex and precautions.    Encounter Problems       Encounter Problems (Active)       Impaired mobility        Perform all bed mobility with minAx1 (Progressing)       Start:  03/09/24    Expected End:  03/23/24            Perform all transfers with ww and minAx1 (Progressing)       Start:  03/09/24    Expected End:  03/23/24            Patient will ambulate >/=10 ft. with ww and minAx1 maintaining RLE wt bearing status (Progressing)       Start:  03/09/24    Expected End:  03/23/24            Patient will perform LE HEP with supervision  (Progressing)       Start:  03/09/24    Expected End:  03/23/24

## 2024-03-10 NOTE — PROGRESS NOTES
Update; Pt accepted at [facility], requested team start precert. ADOD tomorrow 3/11.    0

## 2024-03-11 VITALS
OXYGEN SATURATION: 95 % | RESPIRATION RATE: 18 BRPM | HEART RATE: 82 BPM | SYSTOLIC BLOOD PRESSURE: 132 MMHG | TEMPERATURE: 99.7 F | WEIGHT: 217.81 LBS | HEIGHT: 67 IN | DIASTOLIC BLOOD PRESSURE: 60 MMHG | BODY MASS INDEX: 34.19 KG/M2

## 2024-03-11 LAB
ANION GAP SERPL CALC-SCNC: 11 MMOL/L (ref 10–20)
BUN SERPL-MCNC: 16 MG/DL (ref 6–23)
CALCIUM SERPL-MCNC: 9 MG/DL (ref 8.6–10.3)
CHLORIDE SERPL-SCNC: 99 MMOL/L (ref 98–107)
CO2 SERPL-SCNC: 29 MMOL/L (ref 21–32)
CREAT SERPL-MCNC: 0.61 MG/DL (ref 0.5–1.05)
EGFRCR SERPLBLD CKD-EPI 2021: >90 ML/MIN/1.73M*2
ERYTHROCYTE [DISTWIDTH] IN BLOOD BY AUTOMATED COUNT: 11.5 % (ref 11.5–14.5)
GLUCOSE SERPL-MCNC: 110 MG/DL (ref 74–99)
HCT VFR BLD AUTO: 31.2 % (ref 36–46)
HGB BLD-MCNC: 10.7 G/DL (ref 12–16)
HOLD SPECIMEN: NORMAL
MAGNESIUM SERPL-MCNC: 1.77 MG/DL (ref 1.6–2.4)
MCH RBC QN AUTO: 33.1 PG (ref 26–34)
MCHC RBC AUTO-ENTMCNC: 34.3 G/DL (ref 32–36)
MCV RBC AUTO: 97 FL (ref 80–100)
NRBC BLD-RTO: 0 /100 WBCS (ref 0–0)
PLATELET # BLD AUTO: 247 X10*3/UL (ref 150–450)
POTASSIUM SERPL-SCNC: 3.6 MMOL/L (ref 3.5–5.3)
RBC # BLD AUTO: 3.23 X10*6/UL (ref 4–5.2)
SODIUM SERPL-SCNC: 135 MMOL/L (ref 136–145)
WBC # BLD AUTO: 7.9 X10*3/UL (ref 4.4–11.3)

## 2024-03-11 PROCEDURE — 99239 HOSP IP/OBS DSCHRG MGMT >30: CPT | Performed by: STUDENT IN AN ORGANIZED HEALTH CARE EDUCATION/TRAINING PROGRAM

## 2024-03-11 PROCEDURE — 2500000001 HC RX 250 WO HCPCS SELF ADMINISTERED DRUGS (ALT 637 FOR MEDICARE OP): Performed by: STUDENT IN AN ORGANIZED HEALTH CARE EDUCATION/TRAINING PROGRAM

## 2024-03-11 PROCEDURE — 36415 COLL VENOUS BLD VENIPUNCTURE: CPT | Performed by: HOSPITALIST

## 2024-03-11 PROCEDURE — 97116 GAIT TRAINING THERAPY: CPT | Mod: GP,CQ

## 2024-03-11 PROCEDURE — 97530 THERAPEUTIC ACTIVITIES: CPT | Mod: GP,CQ

## 2024-03-11 PROCEDURE — 80048 BASIC METABOLIC PNL TOTAL CA: CPT | Performed by: HOSPITALIST

## 2024-03-11 PROCEDURE — 85027 COMPLETE CBC AUTOMATED: CPT | Performed by: HOSPITALIST

## 2024-03-11 PROCEDURE — 2500000001 HC RX 250 WO HCPCS SELF ADMINISTERED DRUGS (ALT 637 FOR MEDICARE OP): Performed by: REGISTERED NURSE

## 2024-03-11 PROCEDURE — 83735 ASSAY OF MAGNESIUM: CPT | Performed by: HOSPITALIST

## 2024-03-11 PROCEDURE — 2500000004 HC RX 250 GENERAL PHARMACY W/ HCPCS (ALT 636 FOR OP/ED): Performed by: REGISTERED NURSE

## 2024-03-11 RX ORDER — DOCUSATE SODIUM 100 MG/1
200 CAPSULE, LIQUID FILLED ORAL ONCE
Status: COMPLETED | OUTPATIENT
Start: 2024-03-11 | End: 2024-03-11

## 2024-03-11 RX ADMIN — OXYCODONE HYDROCHLORIDE 5 MG: 5 TABLET ORAL at 01:45

## 2024-03-11 RX ADMIN — DOCUSATE SODIUM 200 MG: 100 CAPSULE, LIQUID FILLED ORAL at 12:56

## 2024-03-11 RX ADMIN — LOSARTAN POTASSIUM: 100 TABLET, FILM COATED ORAL at 08:30

## 2024-03-11 RX ADMIN — POLYETHYLENE GLYCOL 3350 17 G: 17 POWDER, FOR SOLUTION ORAL at 08:29

## 2024-03-11 ASSESSMENT — COGNITIVE AND FUNCTIONAL STATUS - GENERAL
WALKING IN HOSPITAL ROOM: TOTAL
PERSONAL GROOMING: A LITTLE
MOBILITY SCORE: 10
DRESSING REGULAR UPPER BODY CLOTHING: A LITTLE
MOVING TO AND FROM BED TO CHAIR: A LOT
MOVING TO AND FROM BED TO CHAIR: A LOT
WALKING IN HOSPITAL ROOM: A LOT
TURNING FROM BACK TO SIDE WHILE IN FLAT BAD: A LOT
MOVING FROM LYING ON BACK TO SITTING ON SIDE OF FLAT BED WITH BEDRAILS: A LOT
STANDING UP FROM CHAIR USING ARMS: A LOT
HELP NEEDED FOR BATHING: A LOT
STANDING UP FROM CHAIR USING ARMS: A LOT
CLIMB 3 TO 5 STEPS WITH RAILING: TOTAL
MOBILITY SCORE: 11
TURNING FROM BACK TO SIDE WHILE IN FLAT BAD: A LOT
DRESSING REGULAR LOWER BODY CLOTHING: TOTAL
MOVING TO AND FROM BED TO CHAIR: A LOT
DAILY ACTIVITIY SCORE: 15
MOVING FROM LYING ON BACK TO SITTING ON SIDE OF FLAT BED WITH BEDRAILS: A LOT
CLIMB 3 TO 5 STEPS WITH RAILING: TOTAL
CLIMB 3 TO 5 STEPS WITH RAILING: TOTAL
TURNING FROM BACK TO SIDE WHILE IN FLAT BAD: A LOT
WALKING IN HOSPITAL ROOM: TOTAL
TOILETING: A LOT
MOVING FROM LYING ON BACK TO SITTING ON SIDE OF FLAT BED WITH BEDRAILS: A LOT
MOBILITY SCORE: 10
STANDING UP FROM CHAIR USING ARMS: A LOT

## 2024-03-11 ASSESSMENT — PAIN SCALES - GENERAL
PAINLEVEL_OUTOF10: 4

## 2024-03-11 ASSESSMENT — PAIN - FUNCTIONAL ASSESSMENT
PAIN_FUNCTIONAL_ASSESSMENT: 0-10

## 2024-03-11 ASSESSMENT — PAIN DESCRIPTION - DESCRIPTORS
DESCRIPTORS: ACHING
DESCRIPTORS: ACHING

## 2024-03-11 NOTE — DISCHARGE SUMMARY
Discharge Diagnosis  Nondisplaced spiral fracture of shaft of right femur, initial encounter for closed fracture (CMS/[facility])    Issues Requiring Follow-Up  Right distal femur fracture, outpatient follow-up with orthopedic surgery    Discharge Meds     Your medication list        START taking these medications        Instructions Last Dose Given Next Dose Due   cyclobenzaprine 5 mg tablet  Commonly known as: Flexeril      Take 1 tablet (5 mg) by mouth 3 times a day as needed for muscle spasms for up to 7 days.       enoxaparin 40 mg/0.4 mL syringe  Commonly known as: Lovenox      Inject 0.4 mL (40 mg) under the skin once every 24 hours for 25 days.       oxyCODONE 5 mg immediate release tablet  Commonly known as: Roxicodone      Take 1 tablet (5 mg) by mouth every 6 hours if needed for severe pain (7 - 10) or moderate pain (4 - 6) for up to 7 days.              CONTINUE taking these medications        Instructions Last Dose Given Next Dose Due   ALPRAZolam 0.25 mg tablet  Commonly known as: Xanax           amLODIPine 5 mg tablet  Commonly known as: Norvasc           losartan-hydrochlorothiazide 100-12.5 mg tablet  Commonly known as: Hyzaar           NON FORMULARY           NON FORMULARY           NON FORMULARY           NON FORMULARY           vitamins B1 B6 B12 liquid                     Where to Get Your Medications        You can get these medications from any pharmacy    Bring a paper prescription for each of these medications  cyclobenzaprine 5 mg tablet  enoxaparin 40 mg/0.4 mL syringe  oxyCODONE 5 mg immediate release tablet         Test Results Pending At Discharge  Pending Labs       No current pending labs.            Hospital Course  Aida Baker is a 78 y.o. female on day 3 of admission presenting with right distal femur fracture after mechanical fall.  Patient was placed on pain management, orthopedic surgery was consulted, patient is status post internal fixation.  PT/OT evaluated the patient and  recommended SNF placement.  Patient was also placed on VTE prophylaxis and rest of her home medication.  She had acute blood loss anemia secondary to the fracture, we actively monitored hemoglobin and hematocrit.  She has some sort of hypokalemia which was replaced and back to baseline.  Pre-CERT is available today and patient is ready for discharge to SNF for better recovery.  Patient is hemodynamically stable.  She will follow-up as outpatient with orthopedic surgery for further recommendation.    Pertinent Physical Exam At Time of Discharge  Physical Exam  General: Well-developed elderly female, in no acute distress  HEENT: AT, NC, no JVD, no lymphadenopathy, neck supple  Lungs: Coarse rhonchi bilaterally, no wheezing, no crackles  Cardiac: Normal S1-S2, no murmur, no gallop  Abdomen: Soft, nontender, no distention, positive bowel sound  Extremities: No deformity, no edema, pulses intact, right LE wrapped  Neurological: Alert awake oriented x3, sensation intact, clear speech    Time spent 40 minutes  Marylou Del Cid MD

## 2024-03-11 NOTE — PROGRESS NOTES
Social Work Note Precert obtained per direct auth team.  Per attending, pt to discharge to SNF today.  Transportation via ambulance (femur fracture, fall risk) arranged for 2:00PM pickup by CNC.  7000 to be submitted by CNC.  Provided number for report to bedside RN and advised facility of transport time.  PAMELA Estrada

## 2024-03-11 NOTE — PROGRESS NOTES
Physical Therapy    Physical Therapy Treatment    Patient Name: Aida Baker  MRN: [id number]  Today's Date: 3/11/2024  Time Calculation  Start Time: 0848  Stop Time: 0923  Time Calculation (min): 35 min       Assessment/Plan   PT Assessment  PT Assessment Results: Decreased strength, Impaired balance, Decreased mobility, Decreased safety awareness, Orthopedic restrictions  Rehab Prognosis: Good  Medical Staff Made Aware: Yes  End of Session Communication: Bedside nurse  Assessment Comment: pt  would benefit from further therapy to increase functional mobilty and safety  End of Session Patient Position: Up in chair, Alarm on  PT Plan  Inpatient/Swing Bed or Outpatient: Inpatient  Treatment/Interventions: Transfer training, Gait training, Therapeutic exercise, Therapeutic activity  PT Plan: Skilled PT  PT Frequency: BID  PT Discharge Recommendations: Moderate intensity level of continued care  Equipment Recommended upon Discharge: Wheeled walker, Wheelchair PT Recommended Transfer Status: Assist x1, Assist x2, Assistive device    General Visit Information:   PT  Visit  PT Received On: 03/11/24  General  Reason for Referral: R femur fx  Family/Caregiver Present: No  Prior to Session Communication: Bedside nurse (cleared y nursing to participate)  General Comment: pt is agreeable to participate, states she cant believe how tired she is    General Observations:   General Observation: Pt. educated extensively on transfer technique, precautions and concerns with d/c home at this time. Multiple transfer attempts peformed.    Subjective     Precautions:  Precautions  LE Weight Bearing Status:  (Toe Touch WB R LE, KI in placce , no ROM)  Medical Precautions: Fall precautions  Post-Surgical Precautions:  (R knee immobilizer with ambulation; no ROM R knee (per ortho CNP note on 3/9/24))  Braces Applied: R KI in place  Precautions Comment: pt needs vc to maintain TTWB    Vital Signs:     Objective     Pain:  Pain  Assessment  Pain Assessment: 0-10  Pain Score: 4 (denies pain at rest)  Pain Type: Acute pain, Surgical pain  Pain Location: Leg  Pain Orientation: Left  Pain Descriptors: Aching  Pain Interventions: Cold applied    Cognition:  Cognition  Overall Cognitive Status: Within Functional Limits  Orientation Level: Oriented X4    Postural Control:       Balance:   Static Sitting Balance  Static Sitting-Balance Support: Bilateral upper extremity supported  Static Sitting-Level of Assistance: Close supervision  Static Sitting-Comment/Number of Minutes: 7-8 minutes     Static Standing Balance  Static Standing-Balance Support: Bilateral upper extremity supported (on ww)  Static Standing-Level of Assistance: Moderate assistance  Static Standing-Comment/Number of Minutes: 1 min. (assist and cueing to maintain WB status)       Activity Tolerance:  Activity Tolerance  Endurance: Decreased tolerance for upright activites    Treatments:  Therapeutic Exercise  Therapeutic Exercise Performed: Yes  Therapeutic Exercise Activity 1: Pt performed supine ther ex including ankle pumps, quad sets, glut sets , ABD/ADD with LE supported  , KI in place throughout  LAQ and Hip flexion with L LE only     Bed Mobility  Bed Mobility: No (seated upon arrival)    Ambulation/Gait Training  Ambulation/Gait Training Performed: Yes (2ft x 2 with  WW and Mod A , constant vc to maintian TTDWB, KI in place , pt has difficulty advanicng L LE , needs vc to WB through UE , as well as vc for sequencing and walker placement)    Transfers  Transfer: Yes  Transfer 1  Technique 1: Sit to stand, Stand to sit  Trials/Comments 1: transfers sit<---> stand with Mod A  , vc for hand placement . to scoot to EOB or chair prior to standing and to flex at waist , perfomed 4 trials, pt receptive to instruction , able to perform correctly with vc for follow through  Transfers 2  Technique 2: Sit to stand, Stand to sit  Trials/Comments 2: transfers to and from bedside commode  with Mod A and WW with vc for hand plaement and technique . R LE supported on step stool secondary to bedside commode height , commode lowered slightly for future use    Stairs  Stairs: No     Outcome Measures:  [facility] Basic Mobility  Turning from your back to your side while in a flat bed without using bedrails: A lot  Moving from lying on your back to sitting on the side of a flat bed without using bedrails: A lot  Moving to and from bed to chair (including a wheelchair): A lot  Standing up from a chair using your arms (e.g. wheelchair or bedside chair): A lot  To walk in hospital room: A lot  Climbing 3-5 steps with railing: Total  Basic Mobility - Total Score: 11    EDUCATION:  Individual(s) Educated: Patient  Education Provided: Body Mechanics, Fall Risk, Home Exercise Program, Post-Op Precautions  Patient Response to Education: Patient/Caregiver Verbalized Understanding of Information  Education Comment: Pt educated in gait, transfers, ther ex and precautions.    Encounter Problems       Encounter Problems (Active)       Impaired mobility        Perform all bed mobility with minAx1 (Progressing)       Start:  03/09/24    Expected End:  03/23/24            Perform all transfers with ww and minAx1 (Progressing)       Start:  03/09/24    Expected End:  03/23/24            Patient will ambulate >/=10 ft. with ww and minAx1 maintaining RLE wt bearing status (Progressing)       Start:  03/09/24    Expected End:  03/23/24            Patient will perform LE HEP with supervision  (Progressing)       Start:  03/09/24    Expected End:  03/23/24

## 2024-03-11 NOTE — PROGRESS NOTES
24 0856   Discharge Planning   Home or Post Acute Services Post acute facilities (Rehab/SNF/etc)   Type of Post Acute Facility Services Skilled nursing;Rehab   Patient expects to be discharged to: [facility] SN[facility]   Does the patient need discharge transport arranged? Yes   RoundTrip coordination needed? Yes   What day is the transport expected? 24     Notified by Team that Precert Status: Approved  [facility] Auth ID # [id number]  Dates: 2024 - 2024  Aida Gamble MRN [id number]   1945  [employer] # [id number]  [facility]     Dr. Lizet Del Cid notified and states pt is medically ready for DC today. JASON Price notified.

## 2024-03-11 NOTE — PROGRESS NOTES
Physical Therapy    Physical Therapy Treatment    Patient Name: Aida Baker  MRN: [id number]  Today's Date: 3/11/2024  Time Calculation  Start Time: 1329  Stop Time: 1350  Time Calculation (min): 21 min       Assessment/Plan   PT Assessment  PT Assessment Results: Decreased strength, Impaired balance, Decreased mobility, Decreased safety awareness, Orthopedic restrictions  Rehab Prognosis: Good  Medical Staff Made Aware: Yes  End of Session Communication: Bedside nurse  Assessment Comment: pt  would benefit from further therapy to increase functional mobilty and safety  End of Session Patient Position: Up in chair, Alarm on  PT Plan  Inpatient/Swing Bed or Outpatient: Inpatient  Treatment/Interventions: Transfer training, Gait training, Therapeutic exercise, Therapeutic activity  PT Plan: Skilled PT  PT Frequency: BID  PT Discharge Recommendations: Moderate intensity level of continued care  Equipment Recommended upon Discharge: Wheeled walker, Wheelchair PT Recommended Transfer Status: Assist x1, Assist x2, Assistive device    General Visit Information:   PT  Visit  PT Received On: 03/11/24  General  Reason for Referral: R femur fx  Family/Caregiver Present: No  Prior to Session Communication: Bedside nurse (cleared by nursing to participate)  General Comment: pt is agreeable to participate, states she feels like she should be doing better      Subjective     Precautions:  Precautions  LE Weight Bearing Status:  (Toe Touch WB R LE, KI in placce , no ROM)  Medical Precautions: Fall precautions  Post-Surgical Precautions:  (R knee immobilizer with ambulation; no ROM R knee (per ortho CNP note on 3/9/24))  Braces Applied: R KI in place  Precautions Comment: pt needs vc to maintain TTWB    Vital Signs:     Objective     Pain:  Pain Assessment  Pain Assessment: 0-10  Pain Score: 4  Pain Type: Acute pain, Surgical pain  Pain Location: Leg  Pain Orientation: Left  Pain Descriptors: Aching  Pain Interventions: Cold  applied    Cognition:  Cognition  Overall Cognitive Status: Within Functional Limits  Orientation Level: Oriented X4    Activity Tolerance:  Activity Tolerance  Endurance: Decreased tolerance for upright activites    Treatments:  Therapeutic Exercise  Therapeutic Exercise Performed: Yes  Therapeutic Exercise Activity 1: Pt performed supine ther ex including ankle pumps, quad sets, glut sets , ABD/ADD with LE supported  , KI in place throughout  LAQ and Hip flexion with L LE only        Bed Mobility  Bed Mobility: No (seated upon arrival)    Ambulation/Gait Training  Ambulation/Gait Training Performed: Yes (2ft x 2 with  WW and Mod A , constant vc to maintian TTDWB, KI in place , pt has difficulty advanicng L LE , needs vc to WB through UE , as well as vc for sequencing and walker placement)    Transfers  Transfer: Yes  Transfer 1  Technique 1: Sit to stand, Stand to sit  Trials/Comments 1: transfers sit<--> stand with Mod A and vc for hand placment , less vc required compared to this AM  Transfers 2  Technique 2: Sit to stand, Stand to sit  Trials/Comments 2: transfers to and from commode with WW and Mod A, with vc for technique    Stairs  Stairs: No       Outcome Measures:  [facility] Basic Mobility  Turning from your back to your side while in a flat bed without using bedrails: A lot  Moving from lying on your back to sitting on the side of a flat bed without using bedrails: A lot  Moving to and from bed to chair (including a wheelchair): A lot  Standing up from a chair using your arms (e.g. wheelchair or bedside chair): A lot  To walk in hospital room: Total  Climbing 3-5 steps with railing: Total  Basic Mobility - Total Score: 10      EDUCATION:  Individual(s) Educated: Patient  Education Provided: Body Mechanics, Fall Risk, Home Exercise Program, Post-Op Precautions  Patient Response to Education: Patient/Caregiver Verbalized Understanding of Information  Education Comment: Pt educated in gait, transfers, ther ex  and precautions.    Encounter Problems       Encounter Problems (Active)       Impaired mobility        Perform all bed mobility with minAx1 (Progressing)       Start:  03/09/24    Expected End:  03/23/24            Perform all transfers with ww and minAx1 (Progressing)       Start:  03/09/24    Expected End:  03/23/24            Patient will ambulate >/=10 ft. with ww and minAx1 maintaining RLE wt bearing status (Progressing)       Start:  03/09/24    Expected End:  03/23/24            Patient will perform LE HEP with supervision  (Progressing)       Start:  03/09/24    Expected End:  03/23/24

## 2024-03-11 NOTE — CARE PLAN
Problem: Pain  Goal: Takes deep breaths with improved pain control throughout the shift  Outcome: Progressing  Goal: Participates in PT with improved pain control throughout the shift  Outcome: Progressing  Goal: Free from acute confusion related to pain meds throughout the shift  Outcome: Progressing

## 2024-03-11 NOTE — CARE PLAN
The patient's goals for the shift include pain control    The clinical goals for the shift include pt will be free of falls

## 2024-03-12 ENCOUNTER — OFFICE VISIT (OUTPATIENT)
Dept: GERIATRIC MEDICINE | Age: 79
End: 2024-03-12

## 2024-03-12 DIAGNOSIS — I10 HYPERTENSION, UNSPECIFIED TYPE: ICD-10-CM

## 2024-03-12 DIAGNOSIS — F41.9 ANXIETY: ICD-10-CM

## 2024-03-12 DIAGNOSIS — Z47.89 ORTHOPEDIC AFTERCARE: Primary | ICD-10-CM

## 2024-03-12 PROBLEM — S72.344A: Status: RESOLVED | Noted: 2024-03-07 | Resolved: 2024-03-12

## 2024-03-12 RX ORDER — OXYCODONE HYDROCHLORIDE 5 MG/1
5 TABLET ORAL EVERY 6 HOURS PRN
COMMUNITY

## 2024-03-12 RX ORDER — WHEAT DEXTRIN 3 G/3.8 G
4 POWDER (GRAM) ORAL
COMMUNITY

## 2024-03-12 RX ORDER — AMLODIPINE BESYLATE 5 MG/1
5 TABLET ORAL NIGHTLY
COMMUNITY

## 2024-03-12 RX ORDER — CYCLOBENZAPRINE HCL 5 MG
5 TABLET ORAL 3 TIMES DAILY PRN
COMMUNITY

## 2024-03-12 RX ORDER — LOSARTAN POTASSIUM AND HYDROCHLOROTHIAZIDE 12.5; 1 MG/1; MG/1
1 TABLET ORAL NIGHTLY
COMMUNITY

## 2024-03-13 LAB
ANION GAP SERPL CALCULATED.3IONS-SCNC: 9 MEQ/L (ref 9–15)
BUN SERPL-MCNC: 14 MG/DL (ref 8–23)
CALCIUM SERPL-MCNC: 8.9 MG/DL (ref 8.5–9.9)
CHLORIDE SERPL-SCNC: 98 MEQ/L (ref 95–107)
CO2 SERPL-SCNC: 28 MEQ/L (ref 20–31)
CREAT SERPL-MCNC: 0.61 MG/DL (ref 0.5–0.9)
ERYTHROCYTE [DISTWIDTH] IN BLOOD BY AUTOMATED COUNT: 11.6 % (ref 11.5–14.5)
GLUCOSE SERPL-MCNC: 113 MG/DL (ref 70–99)
HCT VFR BLD AUTO: 29.8 % (ref 37–47)
HGB BLD-MCNC: 10 G/DL (ref 12–16)
MCH RBC QN AUTO: 32.4 PG (ref 27–31.3)
MCHC RBC AUTO-ENTMCNC: 33.6 % (ref 33–37)
MCV RBC AUTO: 96.4 FL (ref 79.4–94.8)
PLATELET # BLD AUTO: 267 K/UL (ref 130–400)
POTASSIUM SERPL-SCNC: 3.8 MEQ/L (ref 3.4–4.9)
RBC # BLD AUTO: 3.09 M/UL (ref 4.2–5.4)
SODIUM SERPL-SCNC: 135 MEQ/L (ref 135–144)
WBC # BLD AUTO: 7.2 K/UL (ref 4.8–10.8)

## 2024-03-15 NOTE — PROGRESS NOTES
History and Physical      CHIEF COMPLAINT:  s/p right distal femur fracture s/p ORIF     History of Present Illness:      A 78 y.o. female who is being seen at at [facility] s/p right distal femur fracture who is being seen s/p ORIF.        REVIEW OF SYSTEMS:  A complete 10 Point review of systems was preformed and negative unless previously stated      PMH:  Past Medical History:   Diagnosis Date    Cancer (HCC)     parathyroid    Exposure to blood 2010    contaminated scope was used during colonoscopy.     Hypertension     Insomnia     Notalgia paresthetica     Trigger finger 02/2013    right        Surgical History:  Past Surgical History:   Procedure Laterality Date    CARDIOVASCULAR STRESS TEST  11.20.12    Myocardial perfusion w/exercise.Nml,Nml lt ventricular systolic function EF 72%,Frequent ventic ectopy was present during the study in form of premature ventric contractions,couplets & trigeminy.Nml sinus rhythm,biatrial enlargement,RSR prime in V1,septal Q wave,borderlin lat. wall ST depression w/maximal stress.    COLONOSCOPY  2010    EXCISION OF PARATHYROID MASS  2004    FINGER TRIGGER RELEASE  02/2013    right     HEMORRHOID SURGERY         Medications Prior to Admission:    Prior to Admission medications    Medication Sig Start Date End Date Taking? Authorizing Provider   amLODIPine (NORVASC) 5 MG tablet Take 1 tablet by mouth at bedtime Indications: Angina Pectoris    Katerin Sams MD   Wheat Dextrin (BENEFIBER) POWD Take 4 g by mouth 3 times daily (with meals) Indications: Fiber    Katerin Sams MD   cyclobenzaprine (FLEXERIL) 5 MG tablet Take 1 tablet by mouth 3 times daily as needed for Muscle spasms    Katerin Sams MD   Enoxaparin Sodium 40 MG/0.4ML PSKT Inject 40 mg into the skin daily Indications: Prevention of Unwanted Clot in Veins 3/12/24 4/5/24  Katerin Sams MD   losartan-hydroCHLOROthiazide (HYZAAR) 100-12.5 MG per tablet Take 1 tablet by mouth nightly

## 2024-03-18 ENCOUNTER — OFFICE VISIT (OUTPATIENT)
Dept: GERIATRIC MEDICINE | Age: 79
End: 2024-03-18
Payer: MEDICARE

## 2024-03-18 DIAGNOSIS — Z47.89 ORTHOPEDIC AFTERCARE: Primary | ICD-10-CM

## 2024-03-18 DIAGNOSIS — I10 HYPERTENSION, UNSPECIFIED TYPE: ICD-10-CM

## 2024-03-18 DIAGNOSIS — F41.9 ANXIETY: ICD-10-CM

## 2024-03-18 PROCEDURE — 1123F ACP DISCUSS/DSCN MKR DOCD: CPT | Performed by: INTERNAL MEDICINE

## 2024-03-18 PROCEDURE — 99308 SBSQ NF CARE LOW MDM 20: CPT | Performed by: INTERNAL MEDICINE

## 2024-03-19 ENCOUNTER — OFFICE VISIT (OUTPATIENT)
Dept: GERIATRIC MEDICINE | Age: 79
End: 2024-03-19

## 2024-03-19 DIAGNOSIS — R26.2 DIFFICULTY IN WALKING: Primary | ICD-10-CM

## 2024-03-19 DIAGNOSIS — Z87.81 STATUS POST CLOSED FRACTURE OF RIGHT FEMUR: ICD-10-CM

## 2024-03-19 DIAGNOSIS — M25.561 ACUTE POSTOPERATIVE PAIN OF RIGHT KNEE: ICD-10-CM

## 2024-03-19 DIAGNOSIS — G89.18 ACUTE POSTOPERATIVE PAIN OF RIGHT KNEE: ICD-10-CM

## 2024-03-19 DIAGNOSIS — M62.81 MUSCLE WEAKNESS (GENERALIZED): ICD-10-CM

## 2024-03-19 DIAGNOSIS — F41.9 ANXIETY: ICD-10-CM

## 2024-03-19 NOTE — PROGRESS NOTES
SNF PROGRESS NOTE      Cc- s/p right distal femur fracture s/p ORIF        Patient is a Berna Bobby 78 y.o. female  who is being seen at at [facility] s/p right distal femur fracture who is being seen s/p ORIF.        Patient is sitting up in the chair and working with therapy. Pain is controlled.         Past Medical History:   Diagnosis Date    Cancer (HCC)     parathyroid    Exposure to blood 2010    contaminated scope was used during colonoscopy.     Hypertension     Insomnia     Notalgia paresthetica     Trigger finger 02/2013    right      Niacin and related    VS reviewed      Gen- Alert and oriented x 3   Heart- RRR no murmur no LE edema   Lungs- CTA b/l no resp distress RA  oxygen   Abd- bs x 4        Assessment and Plan    Right distal Femur fracture s/p ORIF  F/u ortho 3/21/24  PT OT   PRN oxy  HTN  Norvasc   Anemia  Depression/anxiety  xanax      Nisa Coronado DO, FACOI     Electronically signed by: Nisa Coronado DO on 3/18/2024

## 2024-03-20 ENCOUNTER — OFFICE VISIT (OUTPATIENT)
Dept: GERIATRIC MEDICINE | Age: 79
End: 2024-03-20

## 2024-03-20 DIAGNOSIS — Z47.89 ORTHOPEDIC AFTERCARE: Primary | ICD-10-CM

## 2024-03-20 DIAGNOSIS — F41.9 ANXIETY: ICD-10-CM

## 2024-03-20 DIAGNOSIS — I10 HYPERTENSION, UNSPECIFIED TYPE: ICD-10-CM

## 2024-03-21 ENCOUNTER — HOSPITAL ENCOUNTER (OUTPATIENT)
Dept: RADIOLOGY | Facility: CLINIC | Age: 79
Discharge: HOME | End: 2024-03-21
Payer: MEDICARE

## 2024-03-21 ENCOUNTER — OFFICE VISIT (OUTPATIENT)
Dept: ORTHOPEDIC SURGERY | Facility: CLINIC | Age: 79
End: 2024-03-21
Payer: MEDICARE

## 2024-03-21 ENCOUNTER — OFFICE VISIT (OUTPATIENT)
Dept: GERIATRIC MEDICINE | Age: 79
End: 2024-03-21
Payer: MEDICARE

## 2024-03-21 DIAGNOSIS — S72.344A: ICD-10-CM

## 2024-03-21 DIAGNOSIS — Z47.89 ORTHOPEDIC AFTERCARE: Primary | ICD-10-CM

## 2024-03-21 DIAGNOSIS — S72.351D DISPLACED COMMINUTED FRACTURE OF SHAFT OF RIGHT FEMUR, SUBSEQUENT ENCOUNTER FOR CLOSED FRACTURE WITH ROUTINE HEALING: Primary | ICD-10-CM

## 2024-03-21 DIAGNOSIS — F41.9 ANXIETY: ICD-10-CM

## 2024-03-21 DIAGNOSIS — I10 HYPERTENSION, UNSPECIFIED TYPE: ICD-10-CM

## 2024-03-21 LAB
ANION GAP SERPL CALCULATED.3IONS-SCNC: 8 MEQ/L (ref 9–15)
BUN SERPL-MCNC: 20 MG/DL (ref 8–23)
CALCIUM SERPL-MCNC: 8.9 MG/DL (ref 8.5–9.9)
CHLORIDE SERPL-SCNC: 104 MEQ/L (ref 95–107)
CO2 SERPL-SCNC: 26 MEQ/L (ref 20–31)
CREAT SERPL-MCNC: 0.55 MG/DL (ref 0.5–0.9)
ERYTHROCYTE [DISTWIDTH] IN BLOOD BY AUTOMATED COUNT: 11.9 % (ref 11.5–14.5)
GLUCOSE SERPL-MCNC: 97 MG/DL (ref 70–99)
HCT VFR BLD AUTO: 32.8 % (ref 37–47)
HGB BLD-MCNC: 10.6 G/DL (ref 12–16)
MCH RBC QN AUTO: 31.9 PG (ref 27–31.3)
MCHC RBC AUTO-ENTMCNC: 32.3 % (ref 33–37)
MCV RBC AUTO: 98.8 FL (ref 79.4–94.8)
PLATELET # BLD AUTO: 398 K/UL (ref 130–400)
POTASSIUM SERPL-SCNC: 3.6 MEQ/L (ref 3.4–4.9)
RBC # BLD AUTO: 3.32 M/UL (ref 4.2–5.4)
SODIUM SERPL-SCNC: 138 MEQ/L (ref 135–144)
WBC # BLD AUTO: 6.9 K/UL (ref 4.8–10.8)

## 2024-03-21 PROCEDURE — 99308 SBSQ NF CARE LOW MDM 20: CPT | Performed by: INTERNAL MEDICINE

## 2024-03-21 PROCEDURE — 73552 X-RAY EXAM OF FEMUR 2/>: CPT | Mod: RT

## 2024-03-21 PROCEDURE — 99024 POSTOP FOLLOW-UP VISIT: CPT | Performed by: ORTHOPAEDIC SURGERY

## 2024-03-21 PROCEDURE — 1123F ACP DISCUSS/DSCN MKR DOCD: CPT | Performed by: INTERNAL MEDICINE

## 2024-03-21 PROCEDURE — 1111F DSCHRG MED/CURRENT MED MERGE: CPT | Performed by: ORTHOPAEDIC SURGERY

## 2024-03-21 PROCEDURE — 73552 X-RAY EXAM OF FEMUR 2/>: CPT | Mod: RIGHT SIDE | Performed by: ORTHOPAEDIC SURGERY

## 2024-03-21 PROCEDURE — G8484 FLU IMMUNIZE NO ADMIN: HCPCS | Performed by: INTERNAL MEDICINE

## 2024-03-21 PROCEDURE — L1833 KO ADJ JNT POS R SUP PRE OTS: HCPCS | Performed by: ORTHOPAEDIC SURGERY

## 2024-03-21 PROCEDURE — 1159F MED LIST DOCD IN RCRD: CPT | Performed by: ORTHOPAEDIC SURGERY

## 2024-03-21 PROCEDURE — 1157F ADVNC CARE PLAN IN RCRD: CPT | Performed by: ORTHOPAEDIC SURGERY

## 2024-03-21 PROCEDURE — 1036F TOBACCO NON-USER: CPT | Performed by: ORTHOPAEDIC SURGERY

## 2024-03-21 RX ORDER — CYCLOBENZAPRINE HCL 5 MG
5 TABLET ORAL 3 TIMES DAILY PRN
Qty: 21 TABLET | Refills: 0 | Status: SHIPPED | OUTPATIENT
Start: 2024-03-21 | End: 2024-03-28

## 2024-03-21 NOTE — PROGRESS NOTES
3/21/2024    Chief Complaint   Patient presents with    Right Hip - Follow-up     Retrograde femoral shaft fracture nailin [facility]  DOI- 3/7/24  DOS- 3/8/254  Xray today       History of Present Illness:  Patient Aida Baker , 78 y.o. female, presents today, 3/21/2024, for evaluation of right  femur   fracture, 2 weeks postop from retrograde femoral nail .  Patient was discharged to rehab facility.  She states that things have been going well.  She no longer has need for narcotic pain medication.  She has been compliant with nonweightbearing restrictions and avoiding range of motion across the knee.  She is here today with her son who helps provide from history.  Overall she states she is doing well.     Review of Systems:   GENERAL: Negative  GI: Negative  MUSCULOSKELETAL: See HPI  SKIN: Negative  NEURO:  Negative     Physical Exam:  GENERAL:  Alert and oriented to person, place, and time.  No acute distress and breathing comfortably; pleasant and cooperative with the examination.  HEENT:  Head is normocephalic and atraumatic.  NECK:  Supple, no visible swelling.  CARDIOVASCULAR:  No palpable tachycardia.  LUNGS:  No audible wheezing or labored breathing.  ABDOMEN:  Nondistended.  Extremities: Evaluation of the right lower extremity finds the patient to have a palpable dorsalis pedis pulse to palpation with brisk capillary refill through the toes. The patient has intact sensorium to tibial, sural, saphenous, deep and superficial peroneal nerves to light touch. EHL, FHL, dorsiflexion and plantarflexion are intact to motor. No lymphedema or lymphatic streaking. No signs of deep vein thrombosis. No open wounds. No signs of infection. Supple compartments to the thigh, leg and foot.  Surgical incisions are well-healed.  She has extension maintained across the knee.     Imaging/Test Results:  Views of the right femur show evidence of minimally displaced right femoral shaft fracture status post retrograde femoral nail  fixation.  Good alignment throughout all 3 planes.  No evidence of hardware failure or migration.     Assessment:  Right femoral shaft fracture 2 weeks out from retrograde femoral nail.     Plan:  Continue toe-touch weightbearing to the right lower extremity for  4 weeks.  Continue with extension of 0 degrees across the knee for 2 weeks that point she can begin to work on motion recovery 0 to 45 degrees under the guidance of therapy.  T scope brace provided.  Follow-up with our office in 4 weeks for repeat clinical and radiographic exam 2 views of the right femur upon return.  Staples were removed in office today and this was tolerated well by patient.  All questions answered at today's visit.  Written instructions provided for skilled nursing facility.    Rocio Combs PA-C

## 2024-03-22 ENCOUNTER — OFFICE VISIT (OUTPATIENT)
Dept: GERIATRIC MEDICINE | Age: 79
End: 2024-03-22

## 2024-03-22 DIAGNOSIS — R26.2 DIFFICULTY IN WALKING: Primary | ICD-10-CM

## 2024-03-22 DIAGNOSIS — M25.561 ACUTE POSTOPERATIVE PAIN OF RIGHT KNEE: ICD-10-CM

## 2024-03-22 DIAGNOSIS — M62.81 MUSCLE WEAKNESS (GENERALIZED): ICD-10-CM

## 2024-03-22 DIAGNOSIS — Z87.81 STATUS POST CLOSED FRACTURE OF RIGHT FEMUR: ICD-10-CM

## 2024-03-22 DIAGNOSIS — G89.18 ACUTE POSTOPERATIVE PAIN OF RIGHT KNEE: ICD-10-CM

## 2024-03-22 NOTE — PROGRESS NOTES
SNF PROGRESS NOTE      Cc-  s/p right distal femur fracture s/p ORIF         Patient is a Berna Bobby 78 y.o. female who is being seen at at [facility] s/p right distal femur fracture who is being seen s/p ORIF.     Patient is sitting in the chair. Pain is controlled.         Past Medical History:   Diagnosis Date    Cancer (HCC)     parathyroid    Exposure to blood 2010    contaminated scope was used during colonoscopy.     Hypertension     Insomnia     Notalgia paresthetica     Trigger finger 02/2013    right      Niacin and related    VS reviewed      Gen- Alert and oriented x 3   Heart- RRR no murmur no LE edema   Lungs- CTA b/l no resp distress RA  oxygen   Abd- bs x 4      Assessment and Plan    Right distal Femur fracture s/p ORIF  F/u ortho 3/21/24  PT OT   PRN oxy  HTN  Norvasc   Anemia  Depression/anxiety  Xanax    Plan discharge 3/26/24      CANDICE Martinez DO     Electronically signed by: Nisa Coronado DO on 3/20/2024

## 2024-03-24 ENCOUNTER — OFFICE VISIT (OUTPATIENT)
Dept: GERIATRIC MEDICINE | Age: 79
End: 2024-03-24
Payer: MEDICARE

## 2024-03-24 DIAGNOSIS — F41.9 ANXIETY: ICD-10-CM

## 2024-03-24 DIAGNOSIS — I10 HYPERTENSION, UNSPECIFIED TYPE: ICD-10-CM

## 2024-03-24 DIAGNOSIS — Z47.89 ORTHOPEDIC AFTERCARE: Primary | ICD-10-CM

## 2024-03-24 PROCEDURE — 1123F ACP DISCUSS/DSCN MKR DOCD: CPT | Performed by: INTERNAL MEDICINE

## 2024-03-24 PROCEDURE — G8484 FLU IMMUNIZE NO ADMIN: HCPCS | Performed by: INTERNAL MEDICINE

## 2024-03-24 PROCEDURE — 99308 SBSQ NF CARE LOW MDM 20: CPT | Performed by: INTERNAL MEDICINE

## 2024-03-25 ENCOUNTER — OFFICE VISIT (OUTPATIENT)
Dept: GERIATRIC MEDICINE | Age: 79
End: 2024-03-25
Payer: MEDICARE

## 2024-03-25 DIAGNOSIS — Z47.89 ORTHOPEDIC AFTERCARE: Primary | ICD-10-CM

## 2024-03-25 DIAGNOSIS — F41.9 ANXIETY: ICD-10-CM

## 2024-03-25 DIAGNOSIS — I10 HYPERTENSION, UNSPECIFIED TYPE: ICD-10-CM

## 2024-03-25 PROCEDURE — 99316 NF DSCHRG MGMT 30 MIN+: CPT | Performed by: INTERNAL MEDICINE

## 2024-03-25 PROCEDURE — G8484 FLU IMMUNIZE NO ADMIN: HCPCS | Performed by: INTERNAL MEDICINE

## 2024-03-25 NOTE — PROGRESS NOTES
SNF PROGRESS NOTE      Cc-  s/p right distal femur fracture s/p ORIF          Patient is a Berna Bobby 78 y.o. female who is being seen at at [facility] s/p right distal femur fracture who is being seen s/p ORIF.      Patient is sitting up and in good spirits. Her leg remains in immobilizer. Pain is controlled. She is working with therapy. Ortho appt today.         Past Medical History:   Diagnosis Date    Cancer (HCC)     parathyroid    Exposure to blood 2010    contaminated scope was used during colonoscopy.     Hypertension     Insomnia     Notalgia paresthetica     Trigger finger 02/2013    right      Niacin and related    VS reviewed      Gen- Alert and oriented x 3   Heart- RRR no murmur no LE edema   Lungs- CTA b/l no resp distress RA  oxygen   Abd- bs x 4        Assessment and Plan    Right distal Femur fracture s/p ORIF  F/u ortho today  PT OT   PRN oxy  HTN  Norvasc   Anemia  Depression/anxiety  Xanax     Plan discharge 3/26/24      CANDICE Martinez DO     Electronically signed by: Nisa Coronado DO on 3/21/2024

## 2024-03-26 ENCOUNTER — OFFICE VISIT (OUTPATIENT)
Dept: GERIATRIC MEDICINE | Age: 79
End: 2024-03-26

## 2024-03-26 DIAGNOSIS — Z87.81 STATUS POST CLOSED FRACTURE OF RIGHT FEMUR: ICD-10-CM

## 2024-03-26 DIAGNOSIS — M25.561 ACUTE POSTOPERATIVE PAIN OF RIGHT KNEE: ICD-10-CM

## 2024-03-26 DIAGNOSIS — R26.2 DIFFICULTY IN WALKING: Primary | ICD-10-CM

## 2024-03-26 DIAGNOSIS — G89.18 ACUTE POSTOPERATIVE PAIN OF RIGHT KNEE: ICD-10-CM

## 2024-03-26 DIAGNOSIS — M62.81 MUSCLE WEAKNESS (GENERALIZED): ICD-10-CM

## 2024-03-26 DIAGNOSIS — I10 HYPERTENSION, UNSPECIFIED TYPE: ICD-10-CM

## 2024-03-27 NOTE — PROGRESS NOTES
SNF PROGRESS NOTE      Cc-  s/p right distal femur fracture s/p ORIF        Patient is a Berna Bobby 78 y.o. female who is being seen at at [facility] s/p right distal femur fracture who is being seen s/p ORIF.       Patient is sitting up in her room in the chair. She denies any complaints of pain. No complaints. She is looking forward to going home.         Past Medical History:   Diagnosis Date    Cancer (HCC)     parathyroid    Exposure to blood 2010    contaminated scope was used during colonoscopy.     Hypertension     Insomnia     Notalgia paresthetica     Trigger finger 02/2013    right      Niacin and related    VS reviewed    Gen- Alert and oriented x 3   Heart- RRR no murmur no LE edema   Lungs- CTA b/l no resp distress RA  oxygen   Abd- bs x 4          Assessment and Plan    Right distal Femur fracture s/p ORIF  PT OT   PRN oxy  HTN  Norvasc   Anemia  Depression/anxiety  Xanax     Plan discharge 3/26/24      CANDICE Martinez DO     Electronically signed by: Nisa Coronado DO on 3/24/2024

## 2024-03-27 NOTE — PROGRESS NOTES
Discharge Summary       Discharge Disposition home with home health     Discharge Condition stable       Discharge time 32 min       Medications   Current Outpatient Medications   Medication Sig Dispense Refill    amLODIPine (NORVASC) 5 MG tablet Take 1 tablet by mouth at bedtime Indications: Angina Pectoris      Wheat Dextrin (BENEFIBER) POWD Take 4 g by mouth 3 times daily (with meals) Indications: Fiber      cyclobenzaprine (FLEXERIL) 5 MG tablet Take 1 tablet by mouth 3 times daily as needed for Muscle spasms      Enoxaparin Sodium 40 MG/0.4ML PSKT Inject 40 mg into the skin daily Indications: Prevention of Unwanted Clot in Veins      losartan-hydroCHLOROthiazide (HYZAAR) 100-12.5 MG per tablet Take 1 tablet by mouth nightly Indications: High Blood Pressure Disorder      oxyCODONE (ROXICODONE) 5 MG immediate release tablet Take 1 tablet by mouth every 6 hours as needed for Pain. X7 days Max Daily Amount: 20 mg      ALPRAZolam (XANAX) 0.5 MG tablet Take 1 tablet by mouth 3 times daily as needed for Anxiety. (Patient taking differently: Take 1 tablet by mouth 3 times daily as needed for Anxiety or Sleep.) 30 tablet 1     No current facility-administered medications for this visit.       Diet- regular     Activity - as tolerated         Brief SNF Course--     This is an 78 y.o. female who was seen at [facility]. The patient worked with PT OT. She was in an immobilizer.           Discharge Diagnosis :    Right distal Femur fracture s/p ORIF  HTN   Anemia  Depression/anxiety        Follow up --       PCP in 1-2 weeks   Ortho as tolerated.       CANDICE Martinez DO     Electronically signed by: Nisa Coronado DO on 3/25/2024

## 2024-03-30 PROBLEM — G89.18 ACUTE POSTOPERATIVE PAIN OF RIGHT KNEE: Status: ACTIVE | Noted: 2024-03-30

## 2024-03-30 PROBLEM — M62.81 MUSCLE WEAKNESS (GENERALIZED): Status: ACTIVE | Noted: 2024-03-30

## 2024-03-30 PROBLEM — F41.9 ANXIETY: Status: ACTIVE | Noted: 2024-03-30

## 2024-03-30 PROBLEM — Z87.81 STATUS POST CLOSED FRACTURE OF RIGHT FEMUR: Status: ACTIVE | Noted: 2024-03-30

## 2024-03-30 PROBLEM — M25.561 ACUTE POSTOPERATIVE PAIN OF RIGHT KNEE: Status: ACTIVE | Noted: 2024-03-30

## 2024-03-30 PROBLEM — R26.2 DIFFICULTY IN WALKING: Status: ACTIVE | Noted: 2024-03-30

## 2024-03-30 NOTE — PROGRESS NOTES
Name: Berna Bobby   Facility: [facility]  [address] [place]  [address]    Date: 3/19/2024     Subjective:     Chief Complaint   Patient presents with    Follow-up     Femur fracture s/p ORIF, anxiety, pain       HPI  Berna Bobby is a 78 y.o. female being seen today for evaluation of acute rehab progress, difficulty walking and muscle weakness, acute postop pain secondary to right femur fracture status post ORIF, and anxiety.  Resident was admitted from [facility] status with a nondisplaced spiral fracture of the shaft of the right femur status post ORIF, she is here for rehab prior to discharging home.  She is up in her wheelchair well-developed in no acute distress.  She has an immobilizer on which is to be on at all times, no range of motion to that right knee.  She is participating in PT and OT, states that she is not weightbearing on the right lower extremity, has been hopping with a walker to the bathroom.  Is able to  the parallel bars.  She is not taking any narcotics, states pain level is 2 out of 10 on a scale of 10, takes Tylenol at bedtime.  Staples are intact to the right knee and the right thigh incisions.  Edema to the right lower extremity.  Denies muscle spasm.  She does admit to anxiety, is taking alprazolam as needed at bedtime, will continue for the next 14 days and she states \"I need it in order to get rest through the night and to be able to you feel rested for therapy tomorrow.\"  Requires minimal assist for dressing, is getting stronger.    Past Medical History:   Diagnosis Date    Cancer (HCC)     parathyroid    Exposure to blood 2010    contaminated scope was used during colonoscopy.     Hypertension     Insomnia     Notalgia paresthetica     Trigger finger 02/2013    right          Allergies:  Reviewed in nursing facility records  Medications:  Reviewed and reconciled in nursing facility records    Review of Systems  A 10 Point review of

## 2024-04-18 ENCOUNTER — OFFICE VISIT (OUTPATIENT)
Dept: ORTHOPEDIC SURGERY | Facility: CLINIC | Age: 79
End: 2024-04-18
Payer: MEDICARE

## 2024-04-18 ENCOUNTER — HOSPITAL ENCOUNTER (OUTPATIENT)
Dept: RADIOLOGY | Facility: CLINIC | Age: 79
Discharge: HOME | End: 2024-04-18
Payer: MEDICARE

## 2024-04-18 DIAGNOSIS — S72.344A: ICD-10-CM

## 2024-04-18 PROCEDURE — 1157F ADVNC CARE PLAN IN RCRD: CPT | Performed by: ORTHOPAEDIC SURGERY

## 2024-04-18 PROCEDURE — 1036F TOBACCO NON-USER: CPT | Performed by: ORTHOPAEDIC SURGERY

## 2024-04-18 PROCEDURE — 73552 X-RAY EXAM OF FEMUR 2/>: CPT | Mod: RIGHT SIDE | Performed by: ORTHOPAEDIC SURGERY

## 2024-04-18 PROCEDURE — 73552 X-RAY EXAM OF FEMUR 2/>: CPT | Mod: RT

## 2024-04-18 PROCEDURE — 1159F MED LIST DOCD IN RCRD: CPT | Performed by: ORTHOPAEDIC SURGERY

## 2024-04-18 PROCEDURE — 99024 POSTOP FOLLOW-UP VISIT: CPT | Performed by: ORTHOPAEDIC SURGERY

## 2024-04-18 NOTE — PROGRESS NOTES
4/18/2024    Chief Complaint   Patient presents with    Right Leg - Follow-up     Retrograde femoral shaft fx nailing 3/8/24  DOI-3/7/24  Xray today       History of Present Illness:  Patient Aida Baker , 78 y.o. female, presents today, 4/18/2024, for evaluation of right  femur   retrograde nail, 6 weeks out from operative fixation doing well. .  She has been working on gentle knee motion recovery.  She did work with home therapy for a while but then they transition to home program.  There is set to follow-up in a couple days.  She has been adherent to nonweightbearing restrictions.       Review of Systems:   GENERAL: Negative  GI: Negative  MUSCULOSKELETAL: See HPI  SKIN: Negative  NEURO:  Negative     Physical Exam:  GENERAL:  Alert and oriented to person, place, and time.  No acute distress and breathing comfortably; pleasant and cooperative with the examination.  HEENT:  Head is normocephalic and atraumatic.  NECK:  Supple, no visible swelling.  CARDIOVASCULAR:  No palpable tachycardia.  LUNGS:  No audible wheezing or labored breathing.  ABDOMEN:  Nondistended.  Extremities: Evaluation of the right lower extremity finds the patient to have a palpable dorsalis pedis pulse to palpation with brisk capillary refill through the toes. The patient has intact sensorium to tibial, sural, saphenous, deep and superficial peroneal nerves to light touch. EHL, FHL, dorsiflexion and plantarflexion are intact to motor. No lymphedema or lymphatic streaking. No signs of deep vein thrombosis. No open wounds. No signs of infection. Supple compartments to the thigh, leg and foot.  Surgical incisions are well-healed.  She has active extension and ability to straight leg raise.  She can comfortably flex the knee to about 75 degrees.  Calf is soft nontender, supple on exam.     Imaging/Test Results:  2 views of the right femur taken in the office today show evidence of healing femoral shaft fracture status post retrograde  femoral nailing.  There is good alignment of hardware.  Increase healing callus formation noted throughout.     Assessment:  Right femoral shaft fracture 6 weeks out from retrograde femoral nailing.     Plan:  Patient can continue to work on knee motion.  Progress to 50% partial weightbearing.  Will give her updated formal physical therapy orders to give to the home therapist.  Follow-up again in 4 weeks for repeat clinical and radiographic exam, x-rays 2 views of the right femur upon return.  All questions answered at today's visit.    Rocio Combs PA-C

## 2024-04-22 ENCOUNTER — TELEPHONE (OUTPATIENT)
Dept: ORTHOPEDIC SURGERY | Facility: CLINIC | Age: 79
End: 2024-04-22
Payer: MEDICARE

## 2024-04-22 NOTE — TELEPHONE ENCOUNTER
Patient called and states she has femoral shaft nailing from 3/8/24 and states physical therapy was unclear of the orders for the patient with her immobilizer, she does not know if she still needs to wear it or if she may discharge it when she is doing physical therapy. She did stated that she just started using her walker.  The patient can be reached at [phone]

## 2024-04-22 NOTE — TELEPHONE ENCOUNTER
Spoke with Rocio, she may take the brace off when she is doing PT.    Called patient and informed her of this.

## 2024-05-16 ENCOUNTER — OFFICE VISIT (OUTPATIENT)
Dept: ORTHOPEDIC SURGERY | Facility: CLINIC | Age: 79
End: 2024-05-16
Payer: MEDICARE

## 2024-05-16 ENCOUNTER — HOSPITAL ENCOUNTER (OUTPATIENT)
Dept: RADIOLOGY | Facility: CLINIC | Age: 79
Discharge: HOME | End: 2024-05-16
Payer: MEDICARE

## 2024-05-16 DIAGNOSIS — S72.344A: ICD-10-CM

## 2024-05-16 DIAGNOSIS — S72.351D DISPLACED COMMINUTED FRACTURE OF SHAFT OF RIGHT FEMUR, SUBSEQUENT ENCOUNTER FOR CLOSED FRACTURE WITH ROUTINE HEALING: Primary | ICD-10-CM

## 2024-05-16 PROCEDURE — 1036F TOBACCO NON-USER: CPT | Performed by: PHYSICIAN ASSISTANT

## 2024-05-16 PROCEDURE — 1157F ADVNC CARE PLAN IN RCRD: CPT | Performed by: PHYSICIAN ASSISTANT

## 2024-05-16 PROCEDURE — 1159F MED LIST DOCD IN RCRD: CPT | Performed by: PHYSICIAN ASSISTANT

## 2024-05-16 PROCEDURE — 99024 POSTOP FOLLOW-UP VISIT: CPT | Performed by: PHYSICIAN ASSISTANT

## 2024-05-16 PROCEDURE — 73552 X-RAY EXAM OF FEMUR 2/>: CPT | Mod: RIGHT SIDE | Performed by: ORTHOPAEDIC SURGERY

## 2024-05-16 PROCEDURE — 73552 X-RAY EXAM OF FEMUR 2/>: CPT | Mod: RT

## 2024-05-16 NOTE — PROGRESS NOTES
5/16/2024    Chief Complaint   Patient presents with    Right Thigh - Follow-up     Retrograde femoral shaft fx s[p nailing  DOS: 3/8/24  Xrays today        History of Present Illness:  Patient Aida Baker , 78 y.o. female, presents today, 5/16/2024, for evaluation of right  retrograde femoral nail   10 weeks postop.  Patient has been 50% partial weightbearing.  She is wearing brace for comfort for knee stabilization, she is ambulating with a walker currently.  She is participating in home physical therapy program.  She feels she is making gains in terms of strength overall.  She has some occasional soreness discomfort over the anterior portion of the knee she feels is more related and nerve pain and occasional burning or tingling with light touch, but states that some days this does not bother her at all .         Review of Systems:   GENERAL: Negative  GI: Negative  MUSCULOSKELETAL: See HPI  SKIN: Negative  NEURO:  Negative     Physical Exam:  GENERAL:  Alert and oriented to person, place, and time.  No acute distress and breathing comfortably; pleasant and cooperative with the examination.  HEENT:  Head is normocephalic and atraumatic.  NECK:  Supple, no visible swelling.  CARDIOVASCULAR:  No palpable tachycardia.  LUNGS:  No audible wheezing or labored breathing.  ABDOMEN:  Nondistended.  Extremities: Evaluation of the right lower extremity finds the patient to have a palpable dorsalis pedis pulse to palpation with brisk capillary refill through the toes. The patient has intact sensorium to tibial, sural, saphenous, deep and superficial peroneal nerves to light touch. EHL, FHL, dorsiflexion and plantarflexion are intact to motor. No lymphedema or lymphatic streaking. No signs of deep vein thrombosis. No open wounds. No signs of infection. Supple compartments to the thigh, leg and foot.  Surgical incisions are all well-healed.  She can actively extend to 0 degrees at the knee comfortably flex about 120  degrees.  Calf is soft nontender supple on exam.  She is no tenderness palpation over the femoral shaft.     Imaging/Test Results:  Views of the femur taken in the office today show evidence of continued healing and increased callus formation after minimally displaced femoral shaft fracture status post retrograde femoral nail fixation.  No evidence of hardware failure migration.     Assessment:  Right femoral shaft fracture status post retrograde nail fixation, 10 weeks postop doing well.     Plan:  Patient can progress to weightbearing as tolerated in the right lower extremity.  Give her a new formal therapy referral to work on continued motion recovery, endurance and strengthening, gait and balance training.  We discussed following the advice of her therapist to wean from brace and transition away from walker for ambulation.  Follow-up again with our office in 6 weeks for repeat clinical and radiographic exam, x-rays 2 views of the right femur upon return.  All questions answered at today's visit.    Rocio Combs PA-C

## 2024-07-02 ENCOUNTER — APPOINTMENT (OUTPATIENT)
Dept: ORTHOPEDIC SURGERY | Facility: CLINIC | Age: 79
End: 2024-07-02
Payer: MEDICARE

## 2024-07-02 ENCOUNTER — HOSPITAL ENCOUNTER (OUTPATIENT)
Dept: RADIOLOGY | Facility: CLINIC | Age: 79
Discharge: HOME | End: 2024-07-02
Payer: MEDICARE

## 2024-07-02 DIAGNOSIS — S72.351D DISPLACED COMMINUTED FRACTURE OF SHAFT OF RIGHT FEMUR, SUBSEQUENT ENCOUNTER FOR CLOSED FRACTURE WITH ROUTINE HEALING: ICD-10-CM

## 2024-07-02 DIAGNOSIS — S72.344A: ICD-10-CM

## 2024-07-02 PROCEDURE — 99213 OFFICE O/P EST LOW 20 MIN: CPT | Performed by: ORTHOPAEDIC SURGERY

## 2024-07-02 PROCEDURE — 1159F MED LIST DOCD IN RCRD: CPT | Performed by: ORTHOPAEDIC SURGERY

## 2024-07-02 PROCEDURE — 1157F ADVNC CARE PLAN IN RCRD: CPT | Performed by: ORTHOPAEDIC SURGERY

## 2024-07-02 PROCEDURE — 1036F TOBACCO NON-USER: CPT | Performed by: ORTHOPAEDIC SURGERY

## 2024-07-02 PROCEDURE — 73552 X-RAY EXAM OF FEMUR 2/>: CPT | Mod: RT

## 2024-07-02 PROCEDURE — 73552 X-RAY EXAM OF FEMUR 2/>: CPT | Mod: RIGHT SIDE | Performed by: ORTHOPAEDIC SURGERY

## 2024-07-02 NOTE — PROGRESS NOTES
History present illness: Patient presents today for ongoing follow-up status post retrograde femoral nailing for distal third right femoral shaft fracture extending to involve the supracondylar region.  The patient has done well in the interim since last visit.  She continues to have some pain in the distal thigh but overall is making nice progress.  She states that her mobility about the knee is good and she is regaining strength and balance.        Physical examination:  General: Alert and oriented to person, place, and time.  No acute distress and breathing comfortably: Pleasant and cooperative with examination.  Extremities: Evaluation of the right lower extremity finds full knee extension and knee flexion of approximately 120 degrees.  No direct tenderness over the distal femur.  No instability to stressing of the knee.  Intact to sensory and motor distally.      Diagnostic studies: X-rays of the right femur demonstrate healing fracture of distal third femoral shaft fracture extending to the supracondylar region.  No hardware failure or migration.  Fracture is nearly completely healed.      Assessment: Healing right distal third femoral shaft fracture      Plan: Recommendations made for continuance of formal therapy to work on endurance training range of motion gait training balance etc.  She still using a cane.  Would like to get her back to her previous level of mobility which did not necessitate ambulatory aid.  Patient will follow-up with me in 6 weeks for x-rays of the right femur.  Anticipate completeness of healing at that time.

## 2024-08-13 ENCOUNTER — APPOINTMENT (OUTPATIENT)
Dept: ORTHOPEDIC SURGERY | Facility: CLINIC | Age: 79
End: 2024-08-13
Payer: MEDICARE

## 2024-09-10 ENCOUNTER — HOSPITAL ENCOUNTER (OUTPATIENT)
Dept: LAB | Age: 79
Discharge: HOME | End: 2024-09-10
Payer: MEDICARE

## 2024-09-10 LAB
ALBUMIN SERPL-MCNC: 4.4 G/DL (ref 3.5–4.6)
ALP SERPL-CCNC: 76 U/L (ref 40–130)
ALT SERPL-CCNC: 11 U/L (ref 0–33)
ANION GAP SERPL CALCULATED.3IONS-SCNC: 11 MEQ/L (ref 9–15)
AST SERPL-CCNC: 13 U/L (ref 0–35)
BILIRUB SERPL-MCNC: 0.5 MG/DL (ref 0.2–0.7)
BUN SERPL-MCNC: 27 MG/DL (ref 8–23)
CALCIUM SERPL-MCNC: 9.5 MG/DL (ref 8.5–9.9)
CHLORIDE SERPL-SCNC: 98 MEQ/L (ref 95–107)
CHOLEST SERPL-MCNC: 210 MG/DL (ref 0–199)
CO2 SERPL-SCNC: 26 MEQ/L (ref 20–31)
CREAT SERPL-MCNC: 0.65 MG/DL (ref 0.5–0.9)
ERYTHROCYTE [DISTWIDTH] IN BLOOD BY AUTOMATED COUNT: 12.4 % (ref 11.5–14.5)
GLOBULIN SER CALC-MCNC: 3 G/DL (ref 2.3–3.5)
GLUCOSE SERPL-MCNC: 103 MG/DL (ref 70–99)
HCT VFR BLD AUTO: 40.4 % (ref 37–47)
HDLC SERPL-MCNC: 78 MG/DL (ref 40–59)
HGB BLD-MCNC: 13.7 G/DL (ref 12–16)
LDLC SERPL CALC-MCNC: 119 MG/DL (ref 0–129)
MCH RBC QN AUTO: 32.9 PG (ref 27–31.3)
MCHC RBC AUTO-ENTMCNC: 33.9 % (ref 33–37)
MCV RBC AUTO: 96.9 FL (ref 79.4–94.8)
PLATELET # BLD AUTO: 320 K/UL (ref 130–400)
POTASSIUM SERPL-SCNC: 4.2 MEQ/L (ref 3.4–4.9)
PROT SERPL-MCNC: 7.4 G/DL (ref 6.3–8)
RBC # BLD AUTO: 4.17 M/UL (ref 4.2–5.4)
SODIUM SERPL-SCNC: 135 MEQ/L (ref 135–144)
TRIGL SERPL-MCNC: 65 MG/DL (ref 0–150)
WBC # BLD AUTO: 6.6 K/UL (ref 4.8–10.8)

## 2024-09-10 PROCEDURE — 80061 LIPID PANEL: CPT

## 2024-09-10 PROCEDURE — 80053 COMPREHEN METABOLIC PANEL: CPT

## 2024-09-10 PROCEDURE — 36415 COLL VENOUS BLD VENIPUNCTURE: CPT

## 2024-09-10 PROCEDURE — 85027 COMPLETE CBC AUTOMATED: CPT

## 2024-10-10 ENCOUNTER — APPOINTMENT (OUTPATIENT)
Dept: CARDIOLOGY | Facility: CLINIC | Age: 79
End: 2024-10-10
Payer: MEDICARE

## 2024-11-06 ENCOUNTER — HOSPITAL ENCOUNTER (OUTPATIENT)
Dept: CARDIOLOGY | Facility: CLINIC | Age: 79
Discharge: HOME | End: 2024-11-06
Payer: MEDICARE

## 2024-11-06 DIAGNOSIS — R01.1 CARDIAC MURMUR, UNSPECIFIED: ICD-10-CM

## 2024-11-06 LAB
AORTIC VALVE MEAN GRADIENT: 12 MMHG
AORTIC VALVE PEAK VELOCITY: 2.29 M/S
AV PEAK GRADIENT: 21 MMHG
AVA (PEAK VEL): 2.01 CM2
AVA (VTI): 2.08 CM2
EJECTION FRACTION APICAL 4 CHAMBER: 85.2
EJECTION FRACTION: 63 %
LEFT VENTRICLE INTERNAL DIMENSION DIASTOLE: 4.5 CM (ref 3.5–6)
LEFT VENTRICULAR OUTFLOW TRACT DIAMETER: 2.2 CM
MITRAL VALVE E/A RATIO: 0.86
RIGHT VENTRICLE PEAK SYSTOLIC PRESSURE: 24.7 MMHG

## 2024-11-06 PROCEDURE — 93306 TTE W/DOPPLER COMPLETE: CPT | Performed by: INTERNAL MEDICINE

## 2024-11-06 PROCEDURE — 93306 TTE W/DOPPLER COMPLETE: CPT

## 2024-11-07 ENCOUNTER — TELEPHONE (OUTPATIENT)
Dept: CARDIOLOGY | Facility: CLINIC | Age: 79
End: 2024-11-07
Payer: MEDICARE

## 2024-11-07 NOTE — TELEPHONE ENCOUNTER
----- Message from Nurse Ying GROSS sent at 11/6/2024  4:32 PM EST -----    ----- Message -----  From: Wenceslao Toussaint MD  Sent: 11/6/2024   4:26 PM EST  To: Ying Wang LPN    Echocardiogram demonstrates normal left ventricular systolic function and no significant valvular abnormalities  ----- Message -----  From: Interface, Syngo - Cardiology Results In  Sent: 11/6/2024   3:15 PM EST  To: Wenceslao Toussaint MD

## (undated) DEVICE — DRILL BIT, CALIBRATED, 4.2MM, EXTRA LONG

## (undated) DEVICE — APPLICATOR, CHLORAPREP, W/ORANGE TINT, 26ML

## (undated) DEVICE — MANIFOLD, 4 PORT NEPTUNE STANDARD

## (undated) DEVICE — DRAPE, TIBURON, SPLIT SHEET, REINF ADH STRIP, 77X122

## (undated) DEVICE — STAPLER, SKIN, PLUS, WIDE, 35

## (undated) DEVICE — DRILL BIT, 4.2MM 3-FLUTED QC 145MM

## (undated) DEVICE — GUIDEWIRE, 3.2 X 400

## (undated) DEVICE — DRAPE, C-ARMOR KIT

## (undated) DEVICE — DRAPE, SHEET, XL

## (undated) DEVICE — STOCKINETTE, IMPERVIOUS, LARGE, 9IN X 48IN

## (undated) DEVICE — SUTURE, VICRYL, 1, 36 IN, V-34, VIOLET

## (undated) DEVICE — IMMOBILIZER, KNEE, 19IN, ADJUSTABLE FOAM, W/ ELASTIC STRAPS

## (undated) DEVICE — Device

## (undated) DEVICE — TOWEL PACK 10-PK

## (undated) DEVICE — BANDAGE, ELASTIC, 6 X 10YD, BEIGE, LF

## (undated) DEVICE — STRAP, ARM BOARD, 32 X 1.5

## (undated) DEVICE — PADDING, CAST, SPECIALIST, 6 IN X 4 YD, STERILE

## (undated) DEVICE — BOWL, BASIN, 32 OZ, STERILE

## (undated) DEVICE — DRAPE SHEET, UTILITY, 26 X 15, W/ TAPE, STERILE

## (undated) DEVICE — DRESSING, NON-ADHERENT, OIL EMULSION, CURITY, 3 X 8 IN, STERILE

## (undated) DEVICE — STRAP, VELCRO, BODY, 4 X 60IN, NS

## (undated) DEVICE — BATH BLANKET STERILE

## (undated) DEVICE — GOWN, SURGICAL, ROYAL SILK, LG, STERILE

## (undated) DEVICE — DRAPE, SHEET, U, W/ADHESIVE STRIP, IMPERVIOUS, 60 X 70 IN, DISPOSABLE, LF, STERILE

## (undated) DEVICE — BANDAGE, COFLEX, 6 X 5 YDS, FOAM TAN, STERILE, LF

## (undated) DEVICE — GOWN, SURGICAL, ROYAL SILK, XL, STERILE

## (undated) DEVICE — SOLUTION, IRRIGATION, STERILE WATER, 1000 ML, POUR BOTTLE

## (undated) DEVICE — GLOVE, SURGICAL, PROTEXIS PI , 7.5, PF, LF

## (undated) DEVICE — SUTURE, MONOCRYL, 3-0, 36 IN, CT-1, UNDYED

## (undated) DEVICE — DRESSING, MEPILEX BORDER, POST-OP AG, 4 X 6 IN

## (undated) DEVICE — GLOVE, SURGICAL, PROTEXIS PI , 7.0, PF, LF

## (undated) DEVICE — REAMING ROD, 3.0MM, 950L MM, 3.8MM BALL TIP